# Patient Record
Sex: MALE | Race: WHITE | Employment: UNEMPLOYED | URBAN - METROPOLITAN AREA
[De-identification: names, ages, dates, MRNs, and addresses within clinical notes are randomized per-mention and may not be internally consistent; named-entity substitution may affect disease eponyms.]

---

## 2023-07-14 ENCOUNTER — HOSPITAL ENCOUNTER (INPATIENT)
Age: 33
LOS: 10 days | Discharge: HOME | DRG: 753 | End: 2023-07-24
Attending: EMERGENCY MEDICINE | Admitting: PSYCHIATRY & NEUROLOGY
Payer: COMMERCIAL

## 2023-07-14 DIAGNOSIS — F29 PSYCHOSIS, UNSPECIFIED PSYCHOSIS TYPE (HCC): Primary | ICD-10-CM

## 2023-07-14 PROBLEM — F31.2 SEVERE MANIC BIPOLAR 1 DISORDER WITH PSYCHOTIC BEHAVIOR (HCC): Status: ACTIVE | Noted: 2023-07-14

## 2023-07-14 LAB
ALBUMIN SERPL-MCNC: 3.9 G/DL (ref 3.5–5.2)
ALP SERPL-CCNC: 72 U/L (ref 40–129)
ALT SERPL-CCNC: 10 U/L (ref 0–40)
AMPHET UR QL SCN: POSITIVE
ANION GAP SERPL CALCULATED.3IONS-SCNC: 11 MMOL/L (ref 7–16)
APAP SERPL-MCNC: <5 MCG/ML (ref 10–30)
AST SERPL-CCNC: 10 U/L (ref 0–39)
BARBITURATES UR QL SCN: NOT DETECTED
BASOPHILS # BLD: 0.03 E9/L (ref 0–0.2)
BASOPHILS NFR BLD: 0.4 % (ref 0–2)
BENZODIAZ UR QL SCN: NOT DETECTED
BILIRUB SERPL-MCNC: 0.3 MG/DL (ref 0–1.2)
BUN SERPL-MCNC: 17 MG/DL (ref 6–20)
CALCIUM SERPL-MCNC: 9.2 MG/DL (ref 8.6–10.2)
CANNABINOIDS UR QL SCN: POSITIVE
CHLORIDE SERPL-SCNC: 104 MMOL/L (ref 98–107)
CK SERPL-CCNC: 70 U/L (ref 20–200)
CO2 SERPL-SCNC: 24 MMOL/L (ref 22–29)
COCAINE UR QL SCN: NOT DETECTED
CREAT SERPL-MCNC: 1.2 MG/DL (ref 0.7–1.2)
DRUG SCREEN COMMENT UR-IMP: ABNORMAL
EOSINOPHIL # BLD: 0.26 E9/L (ref 0.05–0.5)
EOSINOPHIL NFR BLD: 3.5 % (ref 0–6)
ERYTHROCYTE [DISTWIDTH] IN BLOOD BY AUTOMATED COUNT: 13.8 FL (ref 11.5–15)
ETHANOLAMINE SERPL-MCNC: <10 MG/DL (ref 0–0.08)
FENTANYL SCREEN, URINE: NOT DETECTED
GLUCOSE SERPL-MCNC: 81 MG/DL (ref 74–99)
HCT VFR BLD AUTO: 37.8 % (ref 37–54)
HGB BLD-MCNC: 12.3 G/DL (ref 12.5–16.5)
IMM GRANULOCYTES # BLD: 0.03 E9/L
IMM GRANULOCYTES NFR BLD: 0.4 % (ref 0–5)
LYMPHOCYTES # BLD: 2.27 E9/L (ref 1.5–4)
LYMPHOCYTES NFR BLD: 30.2 % (ref 20–42)
MCH RBC QN AUTO: 28.7 PG (ref 26–35)
MCHC RBC AUTO-ENTMCNC: 32.5 % (ref 32–34.5)
MCV RBC AUTO: 88.3 FL (ref 80–99.9)
METHADONE UR QL SCN: NOT DETECTED
MONOCYTES # BLD: 0.39 E9/L (ref 0.1–0.95)
MONOCYTES NFR BLD: 5.2 % (ref 2–12)
NEUTROPHILS # BLD: 4.53 E9/L (ref 1.8–7.3)
NEUTS SEG NFR BLD: 60.3 % (ref 43–80)
OPIATES UR QL SCN: NOT DETECTED
OXYCODONE URINE: NOT DETECTED
PCP UR QL SCN: NOT DETECTED
PLATELET # BLD AUTO: 330 E9/L (ref 130–450)
PMV BLD AUTO: 9 FL (ref 7–12)
POTASSIUM SERPL-SCNC: 3.5 MMOL/L (ref 3.5–5)
PROT SERPL-MCNC: 6.8 G/DL (ref 6.4–8.3)
RBC # BLD AUTO: 4.28 E12/L (ref 3.8–5.8)
SALICYLATES SERPL-MCNC: <0.3 MG/DL (ref 0–30)
SODIUM SERPL-SCNC: 139 MMOL/L (ref 132–146)
TRICYCLIC ANTIDEPRESSANTS SCREEN SERUM: NEGATIVE NG/ML
VALPROATE SERPL-MCNC: 5 MCG/ML (ref 50–100)
WBC # BLD: 7.5 E9/L (ref 4.5–11.5)

## 2023-07-14 PROCEDURE — 1240000000 HC EMOTIONAL WELLNESS R&B

## 2023-07-14 PROCEDURE — 2580000003 HC RX 258

## 2023-07-14 PROCEDURE — 99285 EMERGENCY DEPT VISIT HI MDM: CPT

## 2023-07-14 PROCEDURE — 82077 ASSAY SPEC XCP UR&BREATH IA: CPT

## 2023-07-14 PROCEDURE — 80307 DRUG TEST PRSMV CHEM ANLYZR: CPT

## 2023-07-14 PROCEDURE — 93005 ELECTROCARDIOGRAM TRACING: CPT | Performed by: EMERGENCY MEDICINE

## 2023-07-14 PROCEDURE — 96372 THER/PROPH/DIAG INJ SC/IM: CPT

## 2023-07-14 PROCEDURE — 80053 COMPREHEN METABOLIC PANEL: CPT

## 2023-07-14 PROCEDURE — 36415 COLL VENOUS BLD VENIPUNCTURE: CPT

## 2023-07-14 PROCEDURE — 80164 ASSAY DIPROPYLACETIC ACD TOT: CPT

## 2023-07-14 PROCEDURE — 80143 DRUG ASSAY ACETAMINOPHEN: CPT

## 2023-07-14 PROCEDURE — 80179 DRUG ASSAY SALICYLATE: CPT

## 2023-07-14 PROCEDURE — 6360000002 HC RX W HCPCS: Performed by: EMERGENCY MEDICINE

## 2023-07-14 PROCEDURE — 82550 ASSAY OF CK (CPK): CPT

## 2023-07-14 PROCEDURE — 85025 COMPLETE CBC W/AUTO DIFF WBC: CPT

## 2023-07-14 RX ORDER — WATER 1000 ML/1000ML
INJECTION, SOLUTION INTRAVENOUS
Status: COMPLETED
Start: 2023-07-14 | End: 2023-07-14

## 2023-07-14 RX ORDER — ZIPRASIDONE MESYLATE 20 MG/ML
20 INJECTION, POWDER, LYOPHILIZED, FOR SOLUTION INTRAMUSCULAR ONCE
Status: COMPLETED | OUTPATIENT
Start: 2023-07-14 | End: 2023-07-14

## 2023-07-14 RX ADMIN — ZIPRASIDONE MESYLATE 20 MG: 20 INJECTION, POWDER, LYOPHILIZED, FOR SOLUTION INTRAMUSCULAR at 19:45

## 2023-07-14 RX ADMIN — WATER 10 ML: 1 INJECTION INTRAMUSCULAR; INTRAVENOUS; SUBCUTANEOUS at 19:45

## 2023-07-14 ASSESSMENT — PAIN - FUNCTIONAL ASSESSMENT
PAIN_FUNCTIONAL_ASSESSMENT: NONE - DENIES PAIN
PAIN_FUNCTIONAL_ASSESSMENT: NONE - DENIES PAIN

## 2023-07-15 PROBLEM — F19.10 POLYSUBSTANCE ABUSE (HCC): Status: ACTIVE | Noted: 2023-07-15

## 2023-07-15 PROCEDURE — 6370000000 HC RX 637 (ALT 250 FOR IP): Performed by: PSYCHIATRY & NEUROLOGY

## 2023-07-15 PROCEDURE — 6370000000 HC RX 637 (ALT 250 FOR IP): Performed by: NURSE PRACTITIONER

## 2023-07-15 PROCEDURE — 1240000000 HC EMOTIONAL WELLNESS R&B

## 2023-07-15 PROCEDURE — 90792 PSYCH DIAG EVAL W/MED SRVCS: CPT | Performed by: NURSE PRACTITIONER

## 2023-07-15 PROCEDURE — 93005 ELECTROCARDIOGRAM TRACING: CPT | Performed by: NURSE PRACTITIONER

## 2023-07-15 RX ORDER — CHLORPROMAZINE HYDROCHLORIDE 100 MG/1
100 TABLET, FILM COATED ORAL 3 TIMES DAILY
Status: ON HOLD | COMMUNITY
End: 2023-07-18

## 2023-07-15 RX ORDER — DIVALPROEX SODIUM 500 MG/1
500 TABLET, DELAYED RELEASE ORAL EVERY 12 HOURS SCHEDULED
Status: DISCONTINUED | OUTPATIENT
Start: 2023-07-15 | End: 2023-07-24 | Stop reason: HOSPADM

## 2023-07-15 RX ORDER — HALOPERIDOL 5 MG/1
5 TABLET ORAL EVERY 6 HOURS PRN
Status: DISCONTINUED | OUTPATIENT
Start: 2023-07-15 | End: 2023-07-24 | Stop reason: HOSPADM

## 2023-07-15 RX ORDER — NICOTINE 21 MG/24HR
1 PATCH, TRANSDERMAL 24 HOURS TRANSDERMAL DAILY
Status: DISCONTINUED | OUTPATIENT
Start: 2023-07-15 | End: 2023-07-15

## 2023-07-15 RX ORDER — POLYETHYLENE GLYCOL 3350 17 G
2 POWDER IN PACKET (EA) ORAL
Status: DISCONTINUED | OUTPATIENT
Start: 2023-07-15 | End: 2023-07-24 | Stop reason: HOSPADM

## 2023-07-15 RX ORDER — ACETAMINOPHEN 325 MG/1
650 TABLET ORAL EVERY 6 HOURS PRN
Status: DISCONTINUED | OUTPATIENT
Start: 2023-07-15 | End: 2023-07-24 | Stop reason: HOSPADM

## 2023-07-15 RX ORDER — HYDROXYZINE PAMOATE 50 MG/1
50 CAPSULE ORAL 3 TIMES DAILY PRN
Status: DISCONTINUED | OUTPATIENT
Start: 2023-07-15 | End: 2023-07-24 | Stop reason: HOSPADM

## 2023-07-15 RX ORDER — NALOXONE HYDROCHLORIDE 4 MG/.1ML
1 SPRAY NASAL PRN
Status: ON HOLD | COMMUNITY
End: 2023-07-18

## 2023-07-15 RX ORDER — MAGNESIUM HYDROXIDE/ALUMINUM HYDROXICE/SIMETHICONE 120; 1200; 1200 MG/30ML; MG/30ML; MG/30ML
30 SUSPENSION ORAL PRN
Status: DISCONTINUED | OUTPATIENT
Start: 2023-07-15 | End: 2023-07-24 | Stop reason: HOSPADM

## 2023-07-15 RX ORDER — ARIPIPRAZOLE 5 MG/1
5 TABLET ORAL DAILY
Status: DISCONTINUED | OUTPATIENT
Start: 2023-07-15 | End: 2023-07-17

## 2023-07-15 RX ORDER — LANOLIN ALCOHOL/MO/W.PET/CERES
3 CREAM (GRAM) TOPICAL NIGHTLY PRN
Status: DISCONTINUED | OUTPATIENT
Start: 2023-07-15 | End: 2023-07-24 | Stop reason: HOSPADM

## 2023-07-15 RX ORDER — HALOPERIDOL 5 MG/ML
5 INJECTION INTRAMUSCULAR EVERY 6 HOURS PRN
Status: DISCONTINUED | OUTPATIENT
Start: 2023-07-15 | End: 2023-07-24 | Stop reason: HOSPADM

## 2023-07-15 RX ADMIN — DIVALPROEX SODIUM 500 MG: 500 TABLET, DELAYED RELEASE ORAL at 20:55

## 2023-07-15 RX ADMIN — NICOTINE POLACRILEX 2 MG: 2 LOZENGE ORAL at 13:59

## 2023-07-15 RX ADMIN — NICOTINE POLACRILEX 2 MG: 2 LOZENGE ORAL at 21:02

## 2023-07-15 RX ADMIN — ARIPIPRAZOLE 5 MG: 5 TABLET ORAL at 13:53

## 2023-07-15 RX ADMIN — MELATONIN 3 MG ORAL TABLET 3 MG: 3 TABLET ORAL at 20:56

## 2023-07-15 ASSESSMENT — SLEEP AND FATIGUE QUESTIONNAIRES
AVERAGE NUMBER OF SLEEP HOURS: 5
DO YOU USE A SLEEP AID: NO
SLEEP PATTERN: DIFFICULTY FALLING ASLEEP
SLEEP PATTERN: DIFFICULTY FALLING ASLEEP;DISTURBED/INTERRUPTED SLEEP
DO YOU USE A SLEEP AID: NO
DO YOU HAVE DIFFICULTY SLEEPING: YES
AVERAGE NUMBER OF SLEEP HOURS: 5
DO YOU HAVE DIFFICULTY SLEEPING: YES

## 2023-07-15 ASSESSMENT — LIFESTYLE VARIABLES
HOW MANY STANDARD DRINKS CONTAINING ALCOHOL DO YOU HAVE ON A TYPICAL DAY: 1 OR 2
HOW OFTEN DO YOU HAVE A DRINK CONTAINING ALCOHOL: NEVER
HOW MANY STANDARD DRINKS CONTAINING ALCOHOL DO YOU HAVE ON A TYPICAL DAY: PATIENT DOES NOT DRINK
HOW OFTEN DO YOU HAVE A DRINK CONTAINING ALCOHOL: MONTHLY OR LESS

## 2023-07-15 ASSESSMENT — PAIN SCALES - GENERAL
PAINLEVEL_OUTOF10: 0
PAINLEVEL_OUTOF10: 0

## 2023-07-15 NOTE — CARE COORDINATION
Biopsychosocial Assessment Note    Social work met with patient to complete the biopsychosocial assessment and C-SSRS. Chief Complaint: pt reports \" I admitted myself after I got my car impounded. \"     Mental Status Exam: pt alert&oriented x4. Pt cooperative. Pt mood anxious, labile, unstable affect. Pt eye contact good, speech pressured, tearful at times. Pt thoughts disorganized, tangential, flight of ideas, poor historian. Pt insight/judgement poor. Pt denied SI/HI/AVH. Clinical Summary: pt reports he called to get himself admitted after he had his car impounded. Pt reports a lady in a wheelchair Sammi Gandhi stopped him and told him to give her the keys to his car. Pt stated he deescalated the situation by giving her his keys. Pt spoke about a child having autism and a food stand. Pt was difficult to follow. Pt has a hx of inpatient psychiatric admissions and reports recently being admitted to [facility] three times within the last month. Pt reports two of the times he went in person and the third time he was a patient, unable to elaborate on this further. Pt reports being on a MCNAIR, unable to provide further details. Pt reports being on an AOT with [facility] in [place]. Pt denied any hx of suicide attempts, suicide attempts, or self-injurious behaviors. Pt denied alcohol use however then stated he will have one beer socially. Pt stated \"I love fighting when I'm drunk. \" Pt reports past drug use and stated he has used everything other than PCP. Pt reports having a medical marijuana card. Pt stated he last used meth when he was at home, unable to say when he was last at home. Pt UDS positive for amphetamines and cannabis. Per chart, pt has a hx of meth, cocaine, and fentanyl use. Pt reports a hx of sobriety, unable to provide further details. Pt reports legal hx of being charged at least 35 times for nonviolent offenses. Pt reports a hx of criminal trespassing. Pt reports being on non reported probation.  Pt

## 2023-07-15 NOTE — ED NOTES
Geodon Im given due to patient being very manic, flight of ideas, talking about aggressive behaviors, constant talking.      Rosie Deleon RN  07/14/23 1947
Nurse to nurse given to [facility], RN on [facility].      Dolores Dent RN  07/14/23 6010
Patient giving multiple stories upon arrival of why he is here and what brought him to this area including coming up for work, coming up for a music festive but unable to give name of town it is being held in. Then told the doctor that he was here to see a girl and go to a party he saw on line.   When verifying 2 patient identifiers patient gave a different birth date x2 than the one he gave on arrival.        Marcela Montejo RN  07/14/23 8213
Patient is sleeping.      Joey Rangel RN  07/14/23 8361
Patient's belongings labeled and placed into [facility] AN AFFILIATE OF [facility] ronda #27. 1 bruce Etienne  07/14/23 1501
Presented patient to [facility], NP, accepted to be admitted to [facility].      Cristine Woo RN  07/14/23 1207
The pt was accepted to [facility]. Disposition called to [facility] in admitting.      Extension Jesus George, [facility]  07/14/23 4611
where he \"turns over his car to in pound\". Once medically cleared the pt will be reviewed for admission to psych. Collateral Information: Mom [phone]    Risk Factors:   Out of area      Off meds      Lack of self care      Poor communication skills       Hx of inpt psych      Dad killed self by hanging in 9th grade      Substance abuse      Protective Factors:  Has support of mom      Has a provider      No hx of SI                Suicidal Ideations:  [] Reports:    [] Past [] Present   [x] Denies    Suicide Attempts:  [] Reports:   [x] Denies    C-SSRS Screening Completed by RN: Current Suicide Risk:  [x] No Risk [] Low [] Moderate [] High    Homicidal Ideations:  [] Reports:   [] Past [] Present   [x] Denies     Self Injurious/Self Mutilation Behaviors:  [] Reports:    [] Past [] Present   [x] Denies    Hallucinations/Delusions:  [] Reports:   [x] Denies     Substance Use/Alcohol Use/Addiction:  [x] Reports: Cannabis and meth and social alcohol- once a month- mom stated hx of meth, cocaine and fentanyl  [] Denies   [x] SBIRT Screen Complete. Current or Past Substance Abuse Treatment:  [x] Yes, When and Where: \"my whole life I used to be a fentanyl fiend\"   [] No    Current or Past Mental Health Treatment:  [x] Yes, When and Where: IOP at [facility] Started\"- admitted all of June [facility]- admitted \"at least once a year\"- stated that he does a monthly injection- nii- Mom reported was on depakote  [] No    Legal Issues:  [x]  Yes (Specify) arrested in past for trespassing or loitering- had a guardian until 6 mo ago  []  No    Access to Weapons:  [x]  Yes (Specify)  \"probably\"  []  No    Trauma History:  [x] Reports: \"everything\"  [] Denies     Living Situation: The pt lives in a trailer- per pt and mom    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

## 2023-07-15 NOTE — GROUP NOTE
Group Therapy Note    Date: 7/15/2023  Start Time: 6259  End Time:  4480  Number of Participants: 10    Type of Group: Psychoeducation    Name: Perfectionism    Patient's Goal: Identify what is a perfectionist and how perfectionism affects mental health. Identify traits of a perfectionist vs. striving for excellence. Notes: Patients completed perfectionist quiz to help identify personal traits of perfectionism. CTRS educated patients about perfectionism's affect on mental health and differences between perfectionism vs. striving for excellence. Encouraged patients to share their experiences.     Status After Intervention:  Unchanged    Participation Level: Monopolizing    Participation Quality: Inappropriate and Intrusive    Speech:  pressured    Thought Process/Content: Flight of ideas    Affective Functioning: Exaggerated    Mood: euphoric    Level of consciousness:  Preoccupied    Response to Learning: Resistant    Endings: None Reported    Modes of Intervention: Education    Discipline Responsible: Psychoeducational Specialist      Signature:  Silvia Anton

## 2023-07-15 NOTE — GROUP NOTE
Group Therapy Note    Date: 7/15/2023    Group Start Time: 1120  Group End Time: 1200  Group Topic: Cognitive Skills    [facility] AC[facility] BH 1    DAVID Gonzalez LSW        Group Therapy Note    Attendees: 10       Patient's Goal: to participate in group discussion on self-management. Notes:  pt was an active participant in group discussion. Status After Intervention:  Unchanged    Participation Level:  Active Listener and Interactive    Participation Quality: Appropriate, Attentive, and Sharing      Speech:  normal      Thought Process/Content: Logical      Affective Functioning: Congruent      Mood: anxious      Level of consciousness:  Alert and Oriented x4      Response to Learning: Able to verbalize current knowledge/experience      Endings: None Reported    Modes of Intervention: Education, Support, and Socialization      Discipline Responsible: /Counselor      Signature:  DAVID Lopez LSW

## 2023-07-15 NOTE — H&P
Department of Psychiatry  History and Physical - Adult     CHIEF COMPLAINT: \"I gave this lady son who has autism a card for a discount of prescription drugs. \"    Patient was seen after discussing with the treatment team and reviewing the chart    CIRCUMSTANCES OF ADMISSION: presented to the ED brought in by police after he was reportedly falling onto a woman and trying to take her dog from her. Patient reported that he believed that it was his dog. HISTORY OF PRESENT ILLNESS:      The patient is a 27 y.o. male with significant past history of bipolar 1 disorder and A-fib presented to the ED brought in by police after he was reportedly falling onto a woman and trying to take her dog from her. Patient reported that he believed that it was his dog. Patient reported that he has not slept in 4 days in the ED his urine drug screen is positive for cannabis and amphetamines his blood alcohol level is negative he is medically clear admitted to [address] [place] [address] psychiatric unit for further psychiatric assessment stabilization and treatment      Upon evaluation today patient is extremely disorganized and poor historian. He is unable to give accurate history. He states that he was brought into the hospital by an ambulance after the  called an ambulance. When asked why the  were called an ambulance he states because a lady was trying to take his key father to his car. He states that she was driving a handicapped [employer] with her autistic son and that she wanted his car and his keys and he gave her the key 5. He later then was talking about how a woman's son had a food stand this child had autism and he stopped there and gave him a card to have a discount on prescription drugs and this upset the woman. Patient states he is from [place] but states that his friend lives nearby in [facility].   [place] friends he states prior to going there he

## 2023-07-15 NOTE — CARE COORDINATION
SW attempted to meet with pt to complete biopsychosocial assessment. Pt observed sleeping in bed. Pt did wake up briefly when his name was called however pt quickly fell back asleep. SW will try again later.

## 2023-07-15 NOTE — BH NOTE
Message read at [phone]    [employer] message sent to [facility]- Dr. Jordan Ramachandran regarding new consult.

## 2023-07-15 NOTE — CARE COORDINATION
SW attempted to meet with pt to complete biopsychosocial assessment. Pt observed sleeping in bed and did not wake up when SW said his name. SW will try again later.

## 2023-07-16 PROBLEM — F12.10 MARIHUANA ABUSE: Status: ACTIVE | Noted: 2023-07-16

## 2023-07-16 PROBLEM — R00.0 SINUS TACHYCARDIA: Status: ACTIVE | Noted: 2023-07-16

## 2023-07-16 PROBLEM — F15.10 AMPHETAMINE ABUSE (HCC): Status: ACTIVE | Noted: 2023-07-16

## 2023-07-16 LAB
CHOLEST SERPL-MCNC: 137 MG/DL
HBA1C MFR BLD: 5.5 % (ref 4–5.6)
HDLC SERPL-MCNC: 42 MG/DL
LDLC SERPL CALC-MCNC: 68 MG/DL
TRIGL SERPL-MCNC: 134 MG/DL
TSH SERPL DL<=0.05 MIU/L-ACNC: 1.11 UIU/ML (ref 0.27–4.2)
VLDLC SERPL CALC-MCNC: 27 MG/DL

## 2023-07-16 PROCEDURE — 1240000000 HC EMOTIONAL WELLNESS R&B

## 2023-07-16 PROCEDURE — 84443 ASSAY THYROID STIM HORMONE: CPT

## 2023-07-16 PROCEDURE — 36415 COLL VENOUS BLD VENIPUNCTURE: CPT

## 2023-07-16 PROCEDURE — 83036 HEMOGLOBIN GLYCOSYLATED A1C: CPT

## 2023-07-16 PROCEDURE — 6370000000 HC RX 637 (ALT 250 FOR IP): Performed by: NURSE PRACTITIONER

## 2023-07-16 PROCEDURE — 93005 ELECTROCARDIOGRAM TRACING: CPT

## 2023-07-16 PROCEDURE — 6370000000 HC RX 637 (ALT 250 FOR IP): Performed by: PSYCHIATRY & NEUROLOGY

## 2023-07-16 PROCEDURE — 80061 LIPID PANEL: CPT

## 2023-07-16 PROCEDURE — 99232 SBSQ HOSP IP/OBS MODERATE 35: CPT | Performed by: NURSE PRACTITIONER

## 2023-07-16 RX ADMIN — ARIPIPRAZOLE 5 MG: 5 TABLET ORAL at 09:13

## 2023-07-16 RX ADMIN — NICOTINE POLACRILEX 2 MG: 2 LOZENGE ORAL at 17:40

## 2023-07-16 RX ADMIN — DIVALPROEX SODIUM 500 MG: 500 TABLET, DELAYED RELEASE ORAL at 09:13

## 2023-07-16 RX ADMIN — HYDROXYZINE PAMOATE 50 MG: 50 CAPSULE ORAL at 18:05

## 2023-07-16 RX ADMIN — DIVALPROEX SODIUM 500 MG: 500 TABLET, DELAYED RELEASE ORAL at 21:19

## 2023-07-16 RX ADMIN — NICOTINE POLACRILEX 2 MG: 2 LOZENGE ORAL at 10:00

## 2023-07-16 RX ADMIN — NICOTINE POLACRILEX 2 MG: 2 LOZENGE ORAL at 23:18

## 2023-07-16 RX ADMIN — MELATONIN 3 MG ORAL TABLET 3 MG: 3 TABLET ORAL at 21:19

## 2023-07-16 RX ADMIN — NICOTINE POLACRILEX 2 MG: 2 LOZENGE ORAL at 21:15

## 2023-07-16 ASSESSMENT — PAIN SCALES - GENERAL: PAINLEVEL_OUTOF10: 0

## 2023-07-16 NOTE — CONSULTS
Hospital Medicine  Consult History & Physical        Reason for consult:  medical management   Primary Care Physician:  No primary care provider on file. Date of Service: Pt seen/examined in consultation on 7/15/2023    History Of Present Illness:    Mr. Gaurav Awan, a 27y.o. year old male  who  has no past medical history on file. Medical management, pt is admitted to psych unit and we are consulted for atrial fibrillation control, not on home regimen. No chest pain fever or chills no nausea or vomiting or abdominal pain. Past Medical History:    History reviewed. No pertinent past medical history. Past Surgical History:    History reviewed. No pertinent surgical history. Medications Prior to Admission:    Prior to Admission medications    Medication Sig Start Date End Date Taking? Authorizing Provider   chlorproMAZINE (THORAZINE) 100 MG tablet Take 1 tablet by mouth 3 times daily   Yes Historical Provider, MD   naloxone 4 MG/0.1ML LIQD nasal spray 1 spray by Nasal route as needed for Opioid Reversal   Yes Historical Provider, MD     Medications Prior to Admission: chlorproMAZINE (THORAZINE) 100 MG tablet, Take 1 tablet by mouth 3 times daily  naloxone 4 MG/0.1ML LIQD nasal spray, 1 spray by Nasal route as needed for Opioid Reversal  [unfilled]    Allergies:  Patient has no known allergies.     Social History:   Social History     Socioeconomic History    Marital status: Unknown     Spouse name: Not on file    Number of children: Not on file    Years of education: Not on file    Highest education level: Not on file   Occupational History    Not on file   Tobacco Use    Smoking status: Every Day     Packs/day: 0.50     Types: Cigarettes    Smokeless tobacco: Current   Vaping Use    Vaping Use: Every day   Substance and Sexual Activity    Alcohol use: Not on file    Drug use: Not on file    Sexual activity: Not on file   Other Topics Concern    Not on file   Social History Narrative    Not on

## 2023-07-16 NOTE — BH NOTE
[facility]  Initial Interdisciplinary Treatment Plan NOTE    Review Date & Time: 47/16/23 0900    Patient was not in treatment team    Admission Type:   Admission Type: Involuntary (pink slip)    Reason for admission:  Reason for Admission: \"get some mental health help\"      Estimated Length of Stay Update:  3-5 days  Estimated Discharge Date Update: 3-5 days    EDUCATION:   Learner Progress Toward Treatment Goals: Reviewed results and recommendations of this team, Reviewed group plan and strategies, Reviewed signs, symptoms and risk of self harm and violent behavior, and Reviewed goals and plan of care    Method: Small group    Outcome: Verbalized understanding      PLAN/TREATMENT RECOMMENDATIONS UPDATE:Encourage patient to attend and participate in groups and take medications as prescribed.     GOALS UPDATE:   Time frame for Short-Term Goals: reassess daily    Newton Giraldo RN

## 2023-07-16 NOTE — GROUP NOTE
Group Therapy Note    Date: 7/16/2023  Start Time: 3605  End Time:  1050  Number of Participants: 9    Type of Group: Psychoeducation    Name: Strengths Exploration    Patient's Goal: Identify at least 1 personal strength and at least 1 strength of someone inspirational.    Notes: CTRS educated patients on how strengths affect mental health. Patients identified personal strengths and strengths of inspirational others. Encouraged patients to share experiences of when they found strength. Status After Intervention:  Improved    Participation Level:  Active Listener and Interactive    Participation Quality: Appropriate, Attentive, and Sharing    Speech:  normal    Thought Process/Content: Logical  Linear    Affective Functioning: Congruent    Mood:  Appropriate    Level of consciousness:  Alert and Attentive    Response to Learning: Able to verbalize current knowledge/experience, Able to verbalize/acknowledge new learning, Able to retain information, and Capable of insight    Endings: None Reported    Modes of Intervention: Education    Discipline Responsible: Psychoeducational Specialist      Signature:  Silvia Burleson

## 2023-07-17 LAB
EKG ATRIAL RATE: 91 BPM
EKG ATRIAL RATE: 91 BPM
EKG ATRIAL RATE: 95 BPM
EKG P AXIS: 67 DEGREES
EKG P AXIS: 70 DEGREES
EKG P AXIS: 76 DEGREES
EKG P-R INTERVAL: 140 MS
EKG P-R INTERVAL: 146 MS
EKG P-R INTERVAL: 154 MS
EKG Q-T INTERVAL: 308 MS
EKG Q-T INTERVAL: 314 MS
EKG Q-T INTERVAL: 330 MS
EKG QRS DURATION: 84 MS
EKG QRS DURATION: 88 MS
EKG QRS DURATION: 88 MS
EKG QTC CALCULATION (BAZETT): 378 MS
EKG QTC CALCULATION (BAZETT): 386 MS
EKG QTC CALCULATION (BAZETT): 414 MS
EKG R AXIS: 46 DEGREES
EKG R AXIS: 50 DEGREES
EKG R AXIS: 58 DEGREES
EKG T AXIS: 47 DEGREES
EKG T AXIS: 53 DEGREES
EKG T AXIS: 58 DEGREES
EKG VENTRICULAR RATE: 91 BPM
EKG VENTRICULAR RATE: 91 BPM
EKG VENTRICULAR RATE: 95 BPM

## 2023-07-17 PROCEDURE — 99232 SBSQ HOSP IP/OBS MODERATE 35: CPT | Performed by: NURSE PRACTITIONER

## 2023-07-17 PROCEDURE — 93010 ELECTROCARDIOGRAM REPORT: CPT | Performed by: INTERNAL MEDICINE

## 2023-07-17 PROCEDURE — 6370000000 HC RX 637 (ALT 250 FOR IP): Performed by: NURSE PRACTITIONER

## 2023-07-17 PROCEDURE — 1240000000 HC EMOTIONAL WELLNESS R&B

## 2023-07-17 PROCEDURE — 6370000000 HC RX 637 (ALT 250 FOR IP): Performed by: PSYCHIATRY & NEUROLOGY

## 2023-07-17 RX ORDER — ARIPIPRAZOLE 10 MG/1
10 TABLET ORAL DAILY
Status: DISCONTINUED | OUTPATIENT
Start: 2023-07-18 | End: 2023-07-19

## 2023-07-17 RX ADMIN — ARIPIPRAZOLE 5 MG: 5 TABLET ORAL at 08:29

## 2023-07-17 RX ADMIN — DIVALPROEX SODIUM 500 MG: 500 TABLET, DELAYED RELEASE ORAL at 19:36

## 2023-07-17 RX ADMIN — NICOTINE POLACRILEX 2 MG: 2 LOZENGE ORAL at 13:53

## 2023-07-17 RX ADMIN — NICOTINE POLACRILEX 2 MG: 2 LOZENGE ORAL at 20:20

## 2023-07-17 RX ADMIN — MELATONIN 3 MG ORAL TABLET 3 MG: 3 TABLET ORAL at 19:37

## 2023-07-17 RX ADMIN — DIVALPROEX SODIUM 500 MG: 500 TABLET, DELAYED RELEASE ORAL at 08:29

## 2023-07-17 RX ADMIN — ACETAMINOPHEN 650 MG: 325 TABLET ORAL at 08:19

## 2023-07-17 RX ADMIN — HYDROXYZINE PAMOATE 50 MG: 50 CAPSULE ORAL at 18:46

## 2023-07-17 RX ADMIN — NICOTINE POLACRILEX 2 MG: 2 LOZENGE ORAL at 10:54

## 2023-07-17 RX ADMIN — NICOTINE POLACRILEX 2 MG: 2 LOZENGE ORAL at 06:22

## 2023-07-17 RX ADMIN — NICOTINE POLACRILEX 2 MG: 2 LOZENGE ORAL at 16:13

## 2023-07-17 RX ADMIN — NICOTINE POLACRILEX 2 MG: 2 LOZENGE ORAL at 08:19

## 2023-07-17 ASSESSMENT — PAIN SCALES - GENERAL
PAINLEVEL_OUTOF10: 0
PAINLEVEL_OUTOF10: 3

## 2023-07-17 ASSESSMENT — PAIN DESCRIPTION - DESCRIPTORS: DESCRIPTORS: ACHING

## 2023-07-17 ASSESSMENT — PAIN DESCRIPTION - LOCATION: LOCATION: HEAD

## 2023-07-17 NOTE — CARE COORDINATION
Pt was seen during treatment team. Pt reports that he slept better last night. He reports that he does not need any medications, that he only needs his medical marijuana. Pt then reports that he uses meth to help him sleep. Pt reports that he collects furs from garbage [place] neighborhoods and that he has a zebra print fur at home. He reports that a woman walking a dog was wearing zebra print. Pt then reports that he thinks this dog is his because it listened to him when he gave it commands, such as telling it to go to the bathroom. Pt is elevated with flight of ideas, poor insight/judgement. SW approached pt after treatment team to discuss JOHANN. Pt agreed to sign JOHANN for his mother Dewayne Shah . Pt then states \"The reason I lost my keys is because a lady in a [employer] was driving erratically behind me and she came to me and said Downing Flatten me your fucking keys\" and I trusted her so I gave them to her. I'm too trusting sometimes. \"     LATOYA contacted pt mother Rashida Raymundo  (JOHANN signed) to gain collateral. She reports that her main concern is that in the last 4 weeks, pt has been hospitalized 4 times. She reports that he is diagnosed seasonal bipolar, always has an incident in the spring. Pt has hx of arastada injection. She reports that pt does not have his medications because someone was holding onto them for pt while pt was in a holding assisted. She reports that pt is on AOT through [facility] in [place], [place] on [facility]. She reports pt  is Ronen Brewer . She reports that her main concern for pt is that this cycle will not stop and he will continue to decompensate after discharge. Pt car is impounded and he has no keys. She needs to get keys made for pt car. She feels that pt needs a long term program for his mental health and substance abuse issues. LATOYA discussed pt choice with her. She reports pt father committed suicide when pt was in 9th grade.  She feels pt will die if he

## 2023-07-17 NOTE — GROUP NOTE
Group Therapy Note    Date: 7/17/2023    Group Start Time: 1105  Group End Time: 1150  Group Topic: Psychotherapy    [facility] ACUTE  [address], [facility] Debi        Group Therapy Note    Attendees: 10       Patient's Goal:  To increase socialization and improve interpersonal relationships. Notes:  Pt was an active participant in group discussion. Status After Intervention:  Improved    Participation Level: Active Listener and Interactive    Participation Quality: Appropriate, Attentive, Sharing, and Supportive      Speech:  pressured      Thought Process/Content: Linear      Affective Functioning: Blunted      Mood: elevated      Level of consciousness:  Alert, Oriented x4, and Attentive      Response to Learning: Resistant      Endings: None Reported    Modes of Intervention: Support, Socialization, and Exploration      Discipline Responsible: /Counselor      Signature:   DAVID Mulligan, [place]

## 2023-07-17 NOTE — GROUP NOTE
Group Therapy Note    Date: 7/17/2023    Group Start Time: 3238  Group End Time: 6886  Group Topic: Psychoeducation    [facility] AC[facility] BH 1    Eugenia Rasmussen [place]                                                                        Group Therapy Note    Date: 7/17/2023    Wellness Binder Information  Module Name:  dimensions of wellness   Patient's Goal:  patient will be able to id what parts of his/her life are going well and what can be improved. Notes:  pleasant and sharing in group, able to participate appropriately, willing to take handout. Status After Intervention:  Improved    Participation Level:  Active Listener and Interactive    Participation Quality: Appropriate, Attentive, and Sharing      Speech:  normal      Thought Process/Content: Logical      Affective Functioning: Congruent      Mood: euthymic      Level of consciousness:  Alert, Oriented x4, and Attentive      Response to Learning: Able to verbalize/acknowledge new learning, Able to retain information, and Progressing to goal      Endings: None Reported    Modes of Intervention: Education, Support, Socialization, and Problem-solving      Discipline Responsible: Psychoeducational Specialist      Signature:  Marco Antonio Acuna

## 2023-07-18 LAB
LV EF: 60 %
LVEF MODALITY: NORMAL

## 2023-07-18 PROCEDURE — 99232 SBSQ HOSP IP/OBS MODERATE 35: CPT | Performed by: NURSE PRACTITIONER

## 2023-07-18 PROCEDURE — 6370000000 HC RX 637 (ALT 250 FOR IP): Performed by: PSYCHIATRY & NEUROLOGY

## 2023-07-18 PROCEDURE — 6370000000 HC RX 637 (ALT 250 FOR IP): Performed by: NURSE PRACTITIONER

## 2023-07-18 PROCEDURE — 1240000000 HC EMOTIONAL WELLNESS R&B

## 2023-07-18 PROCEDURE — 93306 TTE W/DOPPLER COMPLETE: CPT

## 2023-07-18 RX ORDER — DIVALPROEX SODIUM 500 MG/1
500 TABLET, DELAYED RELEASE ORAL NIGHTLY
Status: ON HOLD | COMMUNITY
End: 2023-07-24 | Stop reason: HOSPADM

## 2023-07-18 RX ORDER — ARIPIPRAZOLE LAUROXIL 882 MG/3.2ML
882 INJECTION, SUSPENSION, EXTENDED RELEASE INTRAMUSCULAR
Status: ON HOLD | COMMUNITY
End: 2023-07-24 | Stop reason: HOSPADM

## 2023-07-18 RX ORDER — HYDROXYZINE PAMOATE 50 MG/1
50 CAPSULE ORAL DAILY PRN
COMMUNITY

## 2023-07-18 RX ORDER — QUETIAPINE FUMARATE 200 MG/1
200 TABLET, FILM COATED ORAL 2 TIMES DAILY
Status: ON HOLD | COMMUNITY
End: 2023-07-24 | Stop reason: HOSPADM

## 2023-07-18 RX ORDER — TRAZODONE HYDROCHLORIDE 50 MG/1
50 TABLET ORAL NIGHTLY PRN
Status: ON HOLD | COMMUNITY
End: 2023-07-24 | Stop reason: HOSPADM

## 2023-07-18 RX ADMIN — HYDROXYZINE PAMOATE 50 MG: 50 CAPSULE ORAL at 06:03

## 2023-07-18 RX ADMIN — HYDROXYZINE PAMOATE 50 MG: 50 CAPSULE ORAL at 00:06

## 2023-07-18 RX ADMIN — NICOTINE POLACRILEX 2 MG: 2 LOZENGE ORAL at 16:49

## 2023-07-18 RX ADMIN — DIVALPROEX SODIUM 500 MG: 500 TABLET, DELAYED RELEASE ORAL at 08:42

## 2023-07-18 RX ADMIN — NICOTINE POLACRILEX 2 MG: 2 LOZENGE ORAL at 06:05

## 2023-07-18 RX ADMIN — DIVALPROEX SODIUM 500 MG: 500 TABLET, DELAYED RELEASE ORAL at 20:38

## 2023-07-18 RX ADMIN — NICOTINE POLACRILEX 2 MG: 2 LOZENGE ORAL at 22:33

## 2023-07-18 RX ADMIN — HYDROXYZINE PAMOATE 50 MG: 50 CAPSULE ORAL at 11:10

## 2023-07-18 RX ADMIN — ARIPIPRAZOLE 10 MG: 10 TABLET ORAL at 08:42

## 2023-07-18 RX ADMIN — NICOTINE POLACRILEX 2 MG: 2 LOZENGE ORAL at 12:56

## 2023-07-18 RX ADMIN — HYDROXYZINE PAMOATE 50 MG: 50 CAPSULE ORAL at 20:38

## 2023-07-18 RX ADMIN — MELATONIN 3 MG ORAL TABLET 3 MG: 3 TABLET ORAL at 20:38

## 2023-07-18 RX ADMIN — NICOTINE POLACRILEX 2 MG: 2 LOZENGE ORAL at 09:55

## 2023-07-18 ASSESSMENT — PAIN - FUNCTIONAL ASSESSMENT: PAIN_FUNCTIONAL_ASSESSMENT: ACTIVITIES ARE NOT PREVENTED

## 2023-07-18 ASSESSMENT — PAIN SCALES - GENERAL
PAINLEVEL_OUTOF10: 0
PAINLEVEL_OUTOF10: 0

## 2023-07-18 NOTE — GROUP NOTE
Group Therapy Note    Date: 7/18/2023    Group Start Time: 1010  Group End Time: 5266  Group Topic: Psychoeducation    [facility] ACUTE BH [address], [place]                                                                        Group Therapy Note    Date: 7/18/2023  Module Name:  cognitive distortions     Patient's Goal:  patient will be able to id what a cognitive distortion is and how to combat them. Notes:  Pleasant and sharing in group, accepting of handout. Status After Intervention:  Improved    Participation Level:  Active Listener and Interactive    Participation Quality: Appropriate, Attentive, and Sharing      Speech:  normal      Thought Process/Content: Logical      Affective Functioning: Congruent      Mood: euthymic      Level of consciousness:  Alert, Oriented x4, and Attentive      Response to Learning: Able to verbalize/acknowledge new learning, Able to retain information, and Progressing to goal      Endings: None Reported    Modes of Intervention: Education, Support, Socialization, and Problem-solving      Discipline Responsible: Psychoeducational Specialist      Signature:  Rayna Crandall

## 2023-07-18 NOTE — CARE COORDINATION
LATOYA contacted [facility] [phone] to schedule appointment for pt. Pt has appointment 7/27 at 11:15am in office for medications. Pt appointment will be at [address] [place] [address]. LATOYA can fax discharge paperwork to [phone].

## 2023-07-18 NOTE — CARE COORDINATION
LATOYA received call from pt  [place]  asking about pt discharge date. LATOYA informed her discharge date is currently unknown, but that SW will schedule appointments prior to discharge. She states that pt medications should be sent directly to the pharmacy, that pt should not be sent with paper scripts. This pharmacy is [address], [address], [place]. Assigned RN informed.

## 2023-07-18 NOTE — CARE COORDINATION
Sw encouraged pt to attend psychotherapy group today.  Pt unable to attend group due to meeting with medical.

## 2023-07-18 NOTE — DISCHARGE INSTRUCTIONS
Follow up for Tobacco Cessation at:    [facility] Tobacco Treatment                                 Date:  Friday 7/28 at [facility].  [address]   ([address] [place] [address] elevators to 7th floor)   Phone: [phone]   Fax: [phone]

## 2023-07-19 PROCEDURE — 1240000000 HC EMOTIONAL WELLNESS R&B

## 2023-07-19 PROCEDURE — 6370000000 HC RX 637 (ALT 250 FOR IP): Performed by: NURSE PRACTITIONER

## 2023-07-19 PROCEDURE — 6370000000 HC RX 637 (ALT 250 FOR IP): Performed by: PSYCHIATRY & NEUROLOGY

## 2023-07-19 PROCEDURE — 99232 SBSQ HOSP IP/OBS MODERATE 35: CPT | Performed by: NURSE PRACTITIONER

## 2023-07-19 RX ORDER — ARIPIPRAZOLE 15 MG/1
15 TABLET ORAL DAILY
Status: DISCONTINUED | OUTPATIENT
Start: 2023-07-20 | End: 2023-07-24 | Stop reason: HOSPADM

## 2023-07-19 RX ADMIN — NICOTINE POLACRILEX 2 MG: 2 LOZENGE ORAL at 10:08

## 2023-07-19 RX ADMIN — NICOTINE POLACRILEX 2 MG: 2 LOZENGE ORAL at 13:38

## 2023-07-19 RX ADMIN — NICOTINE POLACRILEX 2 MG: 2 LOZENGE ORAL at 18:40

## 2023-07-19 RX ADMIN — DIVALPROEX SODIUM 500 MG: 500 TABLET, DELAYED RELEASE ORAL at 09:06

## 2023-07-19 RX ADMIN — DIVALPROEX SODIUM 500 MG: 500 TABLET, DELAYED RELEASE ORAL at 20:54

## 2023-07-19 RX ADMIN — MELATONIN 3 MG ORAL TABLET 3 MG: 3 TABLET ORAL at 21:20

## 2023-07-19 RX ADMIN — ARIPIPRAZOLE 10 MG: 10 TABLET ORAL at 09:05

## 2023-07-19 RX ADMIN — HYDROXYZINE PAMOATE 50 MG: 50 CAPSULE ORAL at 21:20

## 2023-07-19 RX ADMIN — HYDROXYZINE PAMOATE 50 MG: 50 CAPSULE ORAL at 06:48

## 2023-07-19 RX ADMIN — NICOTINE POLACRILEX 2 MG: 2 LOZENGE ORAL at 20:33

## 2023-07-19 ASSESSMENT — PAIN SCALES - GENERAL: PAINLEVEL_OUTOF10: 0

## 2023-07-19 NOTE — CARE COORDINATION
Pt approached LATOYA and asked for  Edwige's phone number. He states that he wants to discuss his discharge plan with her. SW provided number to pt. Pt also requested an update on the current medications he is prescribed. SW informed pt that he should discuss this with his nurse. Pt verbalized understanding. Pt is elevated, A&Ox4, cooperative and shares good eye contact.

## 2023-07-19 NOTE — CARE COORDINATION
Navigator placed phone call to [address] [address] [facility], left  with  requesting call back. Patient reportedly is on an AOT. Attempting to obtain document from the respective board/court allowing continued hospitalization for treatment/stabilization.     Electronically signed by DAVID Alcantara, LETICIA on 7/19/2023 at 10:09 AM

## 2023-07-19 NOTE — CARE COORDINATION
Received AOT commitment documentation from [facility]. Provided to 7S Unit. Waiting to receive signed court order.     Electronically signed by DAVID Gee, LETICIA on 7/19/2023 at 11:55 AM

## 2023-07-19 NOTE — GROUP NOTE
Group Therapy Note    Date: 7/19/2023  Start Time: 1500  End Time:  1600  Number of Participants: 8    Type of Group: Recreational    Wellness Binder Information  Module Name: Canvas Art    Patient's Goal:  To demonstrate one positive coping mechanism through the use art. Increase socialization amongst peers    Notes:  Patient was provided with a canvas and paint and encouraged to paint canvas to their liking. Markers were utilized to write positive affirmations or mantras on canvas. Patient actively engaged in activity, and demonstrated knowledge of the benefits of art as a coping mechanism. Status After Intervention:  Improved    Participation Level:  Active Listener and Interactive    Participation Quality: Appropriate, Attentive, Sharing, and Supportive      Speech:  normal      Thought Process/Content: Logical      Affective Functioning: Congruent      Mood: euthymic      Level of consciousness:  Alert and Attentive      Response to Learning: Able to verbalize current knowledge/experience, Able to verbalize/acknowledge new learning, and Able to retain information      Endings: None Reported    Modes of Intervention: Education, Socialization, Activity, and Media      Discipline Responsible: Psychoeducational Specialist      Signature:  Jacquelynn Oppenheim, CTRS     Group Therapy Note    Attendees: 8

## 2023-07-19 NOTE — GROUP NOTE
Group Therapy Note    Date: 7/19/2023    Group Start Time: 7219  Group End Time: 4061  Group Topic: Psychoeducation    [facility] 2    Shane De Oliveira                                                                        Group Therapy Note    Date: 7/19/2023  Start Time: 1015  End Time:  1055  Number of Participants: 9    Type of Group: Psychoeducation    Wellness Binder Information  Module Name:  Take time for yourself    Patient's Goal:  ID one or two positive ways to improve self-care and work into pt daily routine    Notes:  CTRS discussed the importance of self-care and provided helpful tips to improve self-care. Patient actively engaged in discussion and was accepting of self-care and self-reflection handout. Status After Intervention:  Improved    Participation Level:  Active Listener and Interactive    Participation Quality: Appropriate, Attentive, and Sharing      Speech:  normal      Thought Process/Content: Logical      Affective Functioning: Congruent      Mood: euthymic      Level of consciousness:  Alert and Attentive      Response to Learning: Able to verbalize current knowledge/experience and Able to verbalize/acknowledge new learning      Endings: None Reported    Modes of Intervention: Education, Socialization, Exploration, and Clarifying      Discipline Responsible: Psychoeducational Specialist      Signature:  FRIEDA De Oliveira     Group Therapy Note    Attendees: 9

## 2023-07-19 NOTE — CARE COORDINATION
Received court order from [facility]. Provided to 7S Unit. Patient admission will continue under probate status.     Electronically signed by DAVID Mabry, LETICIA on 7/19/2023 at 3:57 PM

## 2023-07-19 NOTE — CARE COORDINATION
Navigator spoke with [address] at [phone] . Brianna Altamirano reports that they had lost track of patient after he was recently arrested. Patient reportedly attended an event in the [address] [place] [address] area. Brianna Altamirano reports she will send this Navigator the [employer] Commitment paperwork. She reports she will get the court order from their court and will send it to Navigator once it is signed by the NISHA. Navigator waiting to receive documentation. Will provide to Unit Charge RN once received.     Updated NP, SW Supervisor, and assigned SW.    Electronically signed by Merian Aschoff, MSW, HARRISONW on 7/19/2023 at 10:25 AM

## 2023-07-19 NOTE — GROUP NOTE
Group Therapy Note    Date: 7/19/2023    Group Start Time: 1100  Group End Time: 1150  Group Topic: Psychotherapy    [facility] 1    DAVID Gonzalez LSW        Group Therapy Note    Attendees: 9       Patient's Goal:  To increase socialization and improve interpersonal relationships. Notes:  Pt was an active participant in group discussion. Status After Intervention:  Improved    Participation Level:  Active Listener and Interactive    Participation Quality: Appropriate, Attentive, Sharing, and Supportive      Speech:  normal      Thought Process/Content: Logical      Affective Functioning: Congruent      Mood: anxious      Level of consciousness:  Alert and Oriented x4      Response to Learning: Able to verbalize current knowledge/experience      Endings: None Reported    Modes of Intervention: Education, Support, and Socialization      Discipline Responsible: /Counselor      Signature:  DAVID Wagoner LSW

## 2023-07-20 LAB — VALPROATE SERPL-MCNC: 61 UG/ML (ref 50–100)

## 2023-07-20 PROCEDURE — 36415 COLL VENOUS BLD VENIPUNCTURE: CPT

## 2023-07-20 PROCEDURE — 1240000000 HC EMOTIONAL WELLNESS R&B

## 2023-07-20 PROCEDURE — 80164 ASSAY DIPROPYLACETIC ACD TOT: CPT

## 2023-07-20 PROCEDURE — 99232 SBSQ HOSP IP/OBS MODERATE 35: CPT | Performed by: NURSE PRACTITIONER

## 2023-07-20 PROCEDURE — 6370000000 HC RX 637 (ALT 250 FOR IP): Performed by: NURSE PRACTITIONER

## 2023-07-20 PROCEDURE — 6370000000 HC RX 637 (ALT 250 FOR IP): Performed by: PSYCHIATRY & NEUROLOGY

## 2023-07-20 RX ADMIN — DIVALPROEX SODIUM 500 MG: 500 TABLET, DELAYED RELEASE ORAL at 09:32

## 2023-07-20 RX ADMIN — DIVALPROEX SODIUM 500 MG: 500 TABLET, DELAYED RELEASE ORAL at 20:06

## 2023-07-20 RX ADMIN — NICOTINE POLACRILEX 2 MG: 2 LOZENGE ORAL at 06:14

## 2023-07-20 RX ADMIN — NICOTINE POLACRILEX 2 MG: 2 LOZENGE ORAL at 18:03

## 2023-07-20 RX ADMIN — ARIPIPRAZOLE 15 MG: 15 TABLET ORAL at 09:32

## 2023-07-20 RX ADMIN — HYDROXYZINE PAMOATE 50 MG: 50 CAPSULE ORAL at 20:06

## 2023-07-20 RX ADMIN — HYDROXYZINE PAMOATE 50 MG: 50 CAPSULE ORAL at 06:14

## 2023-07-20 ASSESSMENT — PAIN SCALES - GENERAL: PAINLEVEL_OUTOF10: 0

## 2023-07-20 NOTE — GROUP NOTE
Group Therapy Note    Date: 7/20/2023    Group Start Time: 1110  Group End Time: 0928  Group Topic: Psychotherapy    [facility]  [address], [facility]        Group Therapy Note    Attendees: 11       Patient's Goal:  To increase socialization and improve interpersonal relationships. Notes:  Pt was an active participant in group discussion. Status After Intervention:  Improved    Participation Level: Active Listener and Interactive    Participation Quality: Appropriate, Attentive, Sharing, and Supportive      Speech:  normal      Thought Process/Content: Linear      Affective Functioning: Exaggerated      Mood: elevated      Level of consciousness:  Alert, Oriented x4, and Attentive      Response to Learning: Able to verbalize current knowledge/experience, Able to verbalize/acknowledge new learning, Able to retain information, Capable of insight, and Progressing to goal      Endings: None Reported    Modes of Intervention: Support, Socialization, and Exploration      Discipline Responsible: /Counselor      Signature:   DAVID Angeles, [place]

## 2023-07-20 NOTE — GROUP NOTE
Group Therapy Note    Date: 7/20/2023    Group Start Time: 1010  Group End Time: 7958  Group Topic: Psychoeducation    [facility] 1    Hauserpaddy GaytanSilvia Rebecca                                                                        Group Therapy Note    Date: 7/20/2023    Type of Group: Psychoeducation    Wellness Binder Information  Module Name:  id of stressors     Patient's Goal:  Patient will be able to id physical symptoms to stress and daily/life events one is currently experiencing. Notes:  Pleasant and engaged in group, willing to share when prompted. Status After Intervention:  Improved    Participation Level:  Active Listener and Interactive    Participation Quality: Appropriate, Attentive, and Sharing      Speech:  normal      Thought Process/Content: Logical      Affective Functioning: Congruent      Mood: euthymic      Level of consciousness:  Alert, Oriented x4, and Attentive      Response to Learning: Able to verbalize/acknowledge new learning, Able to retain information, and Progressing to goal      Endings: None Reported    Modes of Intervention: Education, Support, Socialization, Exploration, and Problem-solving      Discipline Responsible: Psychoeducational Specialist      Signature:  Jaunita Landau

## 2023-07-21 PROCEDURE — 6370000000 HC RX 637 (ALT 250 FOR IP): Performed by: PSYCHIATRY & NEUROLOGY

## 2023-07-21 PROCEDURE — 6370000000 HC RX 637 (ALT 250 FOR IP): Performed by: NURSE PRACTITIONER

## 2023-07-21 PROCEDURE — 1240000000 HC EMOTIONAL WELLNESS R&B

## 2023-07-21 RX ADMIN — NICOTINE POLACRILEX 2 MG: 2 LOZENGE ORAL at 17:32

## 2023-07-21 RX ADMIN — ACETAMINOPHEN 650 MG: 325 TABLET ORAL at 00:16

## 2023-07-21 RX ADMIN — NICOTINE POLACRILEX 2 MG: 2 LOZENGE ORAL at 15:34

## 2023-07-21 RX ADMIN — DIVALPROEX SODIUM 500 MG: 500 TABLET, DELAYED RELEASE ORAL at 08:47

## 2023-07-21 RX ADMIN — DIVALPROEX SODIUM 500 MG: 500 TABLET, DELAYED RELEASE ORAL at 21:06

## 2023-07-21 RX ADMIN — ARIPIPRAZOLE 15 MG: 15 TABLET ORAL at 08:47

## 2023-07-21 RX ADMIN — NICOTINE POLACRILEX 2 MG: 2 LOZENGE ORAL at 10:42

## 2023-07-21 RX ADMIN — NICOTINE POLACRILEX 2 MG: 2 LOZENGE ORAL at 08:20

## 2023-07-21 ASSESSMENT — PAIN DESCRIPTION - DESCRIPTORS: DESCRIPTORS: ACHING;STABBING

## 2023-07-21 ASSESSMENT — PAIN - FUNCTIONAL ASSESSMENT: PAIN_FUNCTIONAL_ASSESSMENT: ACTIVITIES ARE NOT PREVENTED

## 2023-07-21 ASSESSMENT — PAIN SCALES - GENERAL
PAINLEVEL_OUTOF10: 0
PAINLEVEL_OUTOF10: 10

## 2023-07-21 ASSESSMENT — PAIN DESCRIPTION - LOCATION: LOCATION: BACK

## 2023-07-21 ASSESSMENT — PAIN DESCRIPTION - ORIENTATION: ORIENTATION: MID

## 2023-07-21 NOTE — GROUP NOTE
Group Therapy Note    Date: 7/21/2023    Group Start Time: 1100  Group End Time: 1140  Group Topic: Cognitive Skills    [facility] 1    DAVID Nguyen, hospitals        Group Therapy Note    Attendees: 15       Patient's Goal:  To discuss ways to improve relationships and communication. Notes:  Pt was an active participant in group discussion. Status After Intervention:  Improved    Participation Level: Active Listener and Interactive    Participation Quality: Appropriate, Attentive, Sharing, and Supportive      Speech:  normal      Thought Process/Content: Logical  Linear      Affective Functioning: Blunted      Mood: elevated      Level of consciousness:  Alert, Oriented x4, and Attentive      Response to Learning: Able to verbalize current knowledge/experience, Able to verbalize/acknowledge new learning, Able to retain information, Capable of insight, and Progressing to goal      Endings: None Reported    Modes of Intervention: Education, Support, Socialization, Exploration, Clarifying, and Problem-solving      Discipline Responsible: /Counselor      Signature:   DAVID Nguyen, [place]

## 2023-07-21 NOTE — GROUP NOTE
Group Therapy Note    Date: 7/21/2023    Group Start Time: 0900  Group End Time: 1000  Group Topic: Cognitive Skills    [facility] 1    DAVID Bueno LSW        Group Therapy Note    Attendees: 14       Patient's Goal:  Identification and exploration of relationships and boundaries    Notes:  pt was attentive in group and was able to identify ways to set boundaries     Status After Intervention:  Improved    Participation Level:  Active Listener and Interactive    Participation Quality: Appropriate, Attentive, and Sharing      Speech:  normal      Thought Process/Content: Logical      Affective Functioning: Congruent      Mood: euthymic      Level of consciousness:  Alert, Oriented x4, and Attentive      Response to Learning: Able to verbalize current knowledge/experience, Able to verbalize/acknowledge new learning, and Able to retain information      Endings: None Reported    Modes of Intervention: Education, Support, Socialization, Exploration, Clarifying, and Problem-solving      Discipline Responsible: /Counselor      Signature:  DAVID Bueno LSW

## 2023-07-22 PROCEDURE — 6370000000 HC RX 637 (ALT 250 FOR IP): Performed by: PSYCHIATRY & NEUROLOGY

## 2023-07-22 PROCEDURE — 6370000000 HC RX 637 (ALT 250 FOR IP): Performed by: NURSE PRACTITIONER

## 2023-07-22 PROCEDURE — 1240000000 HC EMOTIONAL WELLNESS R&B

## 2023-07-22 RX ADMIN — MELATONIN 3 MG ORAL TABLET 3 MG: 3 TABLET ORAL at 01:51

## 2023-07-22 RX ADMIN — NICOTINE POLACRILEX 2 MG: 2 LOZENGE ORAL at 14:56

## 2023-07-22 RX ADMIN — ACETAMINOPHEN 650 MG: 325 TABLET ORAL at 01:51

## 2023-07-22 RX ADMIN — HYDROXYZINE PAMOATE 50 MG: 50 CAPSULE ORAL at 01:51

## 2023-07-22 RX ADMIN — HYDROXYZINE PAMOATE 50 MG: 50 CAPSULE ORAL at 08:41

## 2023-07-22 RX ADMIN — NICOTINE POLACRILEX 2 MG: 2 LOZENGE ORAL at 08:41

## 2023-07-22 RX ADMIN — ACETAMINOPHEN 650 MG: 325 TABLET ORAL at 21:13

## 2023-07-22 RX ADMIN — ARIPIPRAZOLE 15 MG: 15 TABLET ORAL at 08:41

## 2023-07-22 RX ADMIN — DIVALPROEX SODIUM 500 MG: 500 TABLET, DELAYED RELEASE ORAL at 08:41

## 2023-07-22 RX ADMIN — DIVALPROEX SODIUM 500 MG: 500 TABLET, DELAYED RELEASE ORAL at 21:13

## 2023-07-22 RX ADMIN — NICOTINE POLACRILEX 2 MG: 2 LOZENGE ORAL at 11:50

## 2023-07-22 ASSESSMENT — PAIN DESCRIPTION - LOCATION
LOCATION: BACK
LOCATION: BACK

## 2023-07-22 ASSESSMENT — PAIN - FUNCTIONAL ASSESSMENT: PAIN_FUNCTIONAL_ASSESSMENT: ACTIVITIES ARE NOT PREVENTED

## 2023-07-22 ASSESSMENT — PAIN DESCRIPTION - DESCRIPTORS
DESCRIPTORS: STABBING;SHARP
DESCRIPTORS: ACHING

## 2023-07-22 ASSESSMENT — PAIN SCALES - GENERAL
PAINLEVEL_OUTOF10: 0
PAINLEVEL_OUTOF10: 5
PAINLEVEL_OUTOF10: 8
PAINLEVEL_OUTOF10: 0

## 2023-07-22 ASSESSMENT — PAIN DESCRIPTION - ORIENTATION: ORIENTATION: UPPER

## 2023-07-22 NOTE — GROUP NOTE
Group Therapy Note    Date: 7/22/2023    Group Start Time: 1100  Group End Time: 6956  Group Topic: Cognitive Skills    [facility]  Elm Yayo, MSW, LSW        Group Therapy Note    Attendees: 17       Patient's Goal:  pt will be able to identify values in themselves and things that they like about their selves. Pt will be able to discuss how other people see them. Notes:  Pt participated in group and made connections. Status After Intervention:  Improved    Participation Level:  Active Listener and Interactive    Participation Quality: Appropriate, Attentive, Sharing, and Supportive      Speech:  normal      Thought Process/Content: Logical  Linear      Affective Functioning: Congruent      Mood: anxious      Level of consciousness:  Alert, Oriented x4, and Attentive      Response to Learning: Able to verbalize current knowledge/experience, Able to verbalize/acknowledge new learning, Able to retain information, and Capable of insight      Endings: None Reported    Modes of Intervention: Education, Support, Socialization, Exploration, Clarifying, and Problem-solving      Discipline Responsible: /Counselor      Signature:  DAVID Rodriguez, [place]

## 2023-07-22 NOTE — GROUP NOTE
Group Therapy Note    Date: 7/22/2023    Group Start Time: 1500  Group End Time: 1600  Group Topic: Psychoeducation    [facility] 2    Shane Phelps                                                                        Group Therapy Note    Date: 7/22/2023  Start Time: 1500  End Time:  1600  Number of Participants: 16    Type of Group: Psychoeducation    Wellness Binder Information  Module Name:  Ask it Basket  Patient's Goal: To increase socialization amongst peers. Encourage decision making skills    Notes:  CTRS provided each patient with a piece of paper and asked patient to write down either a current stressor or a question. CTRS then gathered each paper and read them off. Patients were encouraged to provide suggestions to their peers. Patient was active in discussion. Status After Intervention:  Improved    Participation Level:  Active Listener and Interactive    Participation Quality: Appropriate, Attentive, and Sharing      Speech:  normal      Thought Process/Content: Logical      Affective Functioning: Congruent      Mood: euthymic      Level of consciousness:  Alert and Attentive      Response to Learning: Able to verbalize current knowledge/experience and Able to verbalize/acknowledge new learning      Endings: None Reported    Modes of Intervention: Education      Discipline Responsible: Psychoeducational Specialist      Signature:  FRIEDA Phelps     Group Therapy Note    Attendees: 16

## 2023-07-22 NOTE — BH NOTE
Patient is awake, alert, oriented x4. He is bright, slightly elevated but improved from early this week. He is pleasant, cooperative, social with peers. His thought process for me is logical and linear. He denies any SI/HI/AVH and does not appear to be responding to internal stimuli. He denies any anxiety or depression. He reports he has been going to groups. He reports he had his long-acting injection yesterday and he denies any ill effects from it. He reports he has had the shot \"for years\" and has no issues with it. Encouraged patient to go to groups. Encouraged appropriate milieu participation. Will continue to monitor.

## 2023-07-23 PROCEDURE — 6370000000 HC RX 637 (ALT 250 FOR IP): Performed by: NURSE PRACTITIONER

## 2023-07-23 PROCEDURE — 1240000000 HC EMOTIONAL WELLNESS R&B

## 2023-07-23 PROCEDURE — 6370000000 HC RX 637 (ALT 250 FOR IP): Performed by: PSYCHIATRY & NEUROLOGY

## 2023-07-23 RX ORDER — IBUPROFEN 400 MG/1
800 TABLET ORAL 3 TIMES DAILY PRN
Status: DISCONTINUED | OUTPATIENT
Start: 2023-07-23 | End: 2023-07-24 | Stop reason: HOSPADM

## 2023-07-23 RX ADMIN — MELATONIN 3 MG ORAL TABLET 3 MG: 3 TABLET ORAL at 00:50

## 2023-07-23 RX ADMIN — NICOTINE POLACRILEX 2 MG: 2 LOZENGE ORAL at 07:21

## 2023-07-23 RX ADMIN — DIVALPROEX SODIUM 500 MG: 500 TABLET, DELAYED RELEASE ORAL at 21:43

## 2023-07-23 RX ADMIN — NICOTINE POLACRILEX 2 MG: 2 LOZENGE ORAL at 19:02

## 2023-07-23 RX ADMIN — IBUPROFEN 800 MG: 400 TABLET, FILM COATED ORAL at 16:53

## 2023-07-23 RX ADMIN — ACETAMINOPHEN 650 MG: 325 TABLET ORAL at 10:56

## 2023-07-23 RX ADMIN — ARIPIPRAZOLE 15 MG: 15 TABLET ORAL at 08:45

## 2023-07-23 RX ADMIN — NICOTINE POLACRILEX 2 MG: 2 LOZENGE ORAL at 12:44

## 2023-07-23 RX ADMIN — HYDROXYZINE PAMOATE 50 MG: 50 CAPSULE ORAL at 19:02

## 2023-07-23 RX ADMIN — NICOTINE POLACRILEX 2 MG: 2 LOZENGE ORAL at 08:46

## 2023-07-23 RX ADMIN — DIVALPROEX SODIUM 500 MG: 500 TABLET, DELAYED RELEASE ORAL at 08:45

## 2023-07-23 ASSESSMENT — PAIN DESCRIPTION - LOCATION
LOCATION: BACK
LOCATION: BACK

## 2023-07-23 ASSESSMENT — PAIN SCALES - GENERAL
PAINLEVEL_OUTOF10: 8
PAINLEVEL_OUTOF10: 0
PAINLEVEL_OUTOF10: 8

## 2023-07-23 ASSESSMENT — PAIN DESCRIPTION - DESCRIPTORS
DESCRIPTORS: POUNDING;STABBING;SHARP
DESCRIPTORS: STABBING

## 2023-07-23 ASSESSMENT — PAIN DESCRIPTION - ORIENTATION
ORIENTATION: MID
ORIENTATION: LOWER

## 2023-07-23 NOTE — GROUP NOTE
Group Therapy Note    Date: 7/23/2023  Start Time: 1400  End Time:  4291  Number of Participants: 14    Type of Group: Psychoeducation    Wellness Binder Information  Module Name:  Chair Yoga and Meditation    Patient's Goal:  To engage in one new relaxation technique and ID one way to engage in meditation    Notes:  Patient actively engaged in [employer] led chair yoga and was provided a handout on poses. Patient actively engaged in relaxation mad libs. Status After Intervention:  Improved    Participation Level:  Active Listener and Interactive    Participation Quality: Appropriate, Attentive, and Sharing      Speech:  normal      Thought Process/Content: Logical      Affective Functioning: Congruent      Mood: euthymic      Level of consciousness:  Alert and Attentive      Response to Learning: Able to verbalize current knowledge/experience and Able to verbalize/acknowledge new learning      Endings: None Reported    Modes of Intervention: Education, Socialization, and Movement      Discipline Responsible: Psychoeducational Specialist      Signature:  FRIEDA Diop     Group Therapy Note    Attendees: 15

## 2023-07-24 VITALS
WEIGHT: 230 LBS | RESPIRATION RATE: 15 BRPM | OXYGEN SATURATION: 98 % | HEART RATE: 101 BPM | BODY MASS INDEX: 30.48 KG/M2 | TEMPERATURE: 98.1 F | HEIGHT: 73 IN | DIASTOLIC BLOOD PRESSURE: 55 MMHG | SYSTOLIC BLOOD PRESSURE: 96 MMHG

## 2023-07-24 PROCEDURE — 6370000000 HC RX 637 (ALT 250 FOR IP): Performed by: NURSE PRACTITIONER

## 2023-07-24 PROCEDURE — 6370000000 HC RX 637 (ALT 250 FOR IP): Performed by: PSYCHIATRY & NEUROLOGY

## 2023-07-24 RX ORDER — ARIPIPRAZOLE 15 MG/1
15 TABLET ORAL DAILY
Qty: 30 TABLET | Refills: 0 | Status: SHIPPED | OUTPATIENT
Start: 2023-07-25 | End: 2023-08-24

## 2023-07-24 RX ORDER — DIVALPROEX SODIUM 500 MG/1
500 TABLET, DELAYED RELEASE ORAL EVERY 12 HOURS SCHEDULED
Qty: 60 TABLET | Refills: 0 | Status: SHIPPED | OUTPATIENT
Start: 2023-07-24 | End: 2023-07-24 | Stop reason: SDUPTHER

## 2023-07-24 RX ORDER — POLYETHYLENE GLYCOL 3350 17 G
2 POWDER IN PACKET (EA) ORAL
Qty: 100 EACH | Refills: 3
Start: 2023-07-24 | End: 2023-08-23

## 2023-07-24 RX ORDER — ARIPIPRAZOLE 15 MG/1
15 TABLET ORAL DAILY
Qty: 30 TABLET | Refills: 0 | Status: SHIPPED | OUTPATIENT
Start: 2023-07-25 | End: 2023-07-24 | Stop reason: SDUPTHER

## 2023-07-24 RX ORDER — DIVALPROEX SODIUM 500 MG/1
500 TABLET, DELAYED RELEASE ORAL EVERY 12 HOURS SCHEDULED
Qty: 60 TABLET | Refills: 0 | Status: SHIPPED | OUTPATIENT
Start: 2023-07-24 | End: 2023-08-23

## 2023-07-24 RX ADMIN — DIVALPROEX SODIUM 500 MG: 500 TABLET, DELAYED RELEASE ORAL at 08:36

## 2023-07-24 RX ADMIN — ACETAMINOPHEN 650 MG: 325 TABLET ORAL at 08:36

## 2023-07-24 RX ADMIN — IBUPROFEN 800 MG: 400 TABLET, FILM COATED ORAL at 13:13

## 2023-07-24 RX ADMIN — NICOTINE POLACRILEX 2 MG: 2 LOZENGE ORAL at 06:08

## 2023-07-24 RX ADMIN — ARIPIPRAZOLE 15 MG: 15 TABLET ORAL at 08:36

## 2023-07-24 ASSESSMENT — PAIN DESCRIPTION - LOCATION
LOCATION: BACK
LOCATION: BACK

## 2023-07-24 ASSESSMENT — PAIN SCALES - GENERAL
PAINLEVEL_OUTOF10: 8
PAINLEVEL_OUTOF10: 6
PAINLEVEL_OUTOF10: 0

## 2023-07-24 ASSESSMENT — PAIN DESCRIPTION - DESCRIPTORS
DESCRIPTORS: ACHING;SHARP
DESCRIPTORS: ACHING

## 2023-07-24 ASSESSMENT — PAIN DESCRIPTION - ORIENTATION: ORIENTATION: LOWER

## 2023-07-24 NOTE — CARE COORDINATION
LATOYA contacted pt mother Ingrid [facility]  (JOHANN signed) to discuss pt. She states that pt is sounding much better to her. She states that he sounds very clear. She states that pt told her he feels like he got a lot of help while here. She is comfortable if pt were to discharge and has no concerns for pt. She states that pt is able to return home where he lives alone. 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 She would prefer that pt use his insurance for a ride home. She states that pt lives at [address] [place] [address], [facility]. She had questions regarding pt medications, SW transferred call to nurse.

## 2023-07-24 NOTE — DISCHARGE SUMMARY
workers obtain collateral information from patient's mother as well as the patient's  who was able to voicing concerns that they had. They reported no safety concerns no access to any guns. Patient is counseled he continues to be his drugs or alcohol he may act out impulsively causing serious harm to himself or others even though it may be unintentional.  He does understand of this as the capacity understand this treatment team felt the patient obtain the maximum benefit from his hospitalization he was set up with an outpatient mental health agency for outpatient follow-up services. At the time of discharge patient did not show any impulsive behavior. He was up on the unit he was attending groups and socializing with peers. He vehemently denied any suicidal homicidal ideations intent or plan. He was eating well and sleeping well there are no neurovegetative signs or symptoms of depression he denied any auditory or visual hallucinations. There are no overt or covert signs of psychosis. He was appreciative of the help that he received here. This patient no longer meets criteria for inpatient hospitalization. No AVH or paranoid thoughts  No Hopeless or worthless feeling  No active SI/HI  Appetite:  [x] Normal  [] Increased  [] Decreased    Sleep:       [x] Normal  [] Fair       [] Poor            Energy:    [x] Normal  [] Increased  [] Decreased     SI [] Present  [x] Absent  HI  []Present  [x] Absent   Aggression:  [] yes  [x] no  Patient is [x] able  [] unable to CONTRACT FOR SAFETY   Medication side effects(SE):  [x] None(Psych. Meds.) [] Other      Mental Status Examination on discharge:    Level of consciousness:  within normal limits   Appearance:  well-appearing  Behavior/Motor:  no abnormalities noted  Attitude toward examiner:  attentive and good eye contact  Speech:  spontaneous, normal rate and normal volume   Mood:  \"My mood is good\"  Affect: Appropriate and pleasant  Thought

## 2023-07-24 NOTE — CARE COORDINATION
SW received a call from pt mother Ana Paula Quiroz  (JOHANN signed). Ana Paula Quiroz is requesting for someone to call her when pt ride arrives so she can head to his home.

## 2023-07-24 NOTE — CARE COORDINATION
SW met with pt to discuss discharge. Pt found resting in bed, sits up and shares fair eye contact with SW. Pt states that he is feeling good mentally, denies SI/HI/AVH. Pt states that he plans to return to his home and states the address is [address] [place] [address], [id number]. Pt states that he would like to use his insurance ride to get there today. Pt states that he plans to follow up with [facility]. SW provided hospital excuse for pt. LATOYA contacted [employer] transportation [phone]. LATOYA spoke with Manny Mon to schedule transportation home from the hospital. LATOYA instructed that ride should arrive to main entrance on [facility] and call nurse's station upon arrival, number provided. Pt trip number is #[phone]. LATOYA contacted [facility] [phone] to inform them of pt long acting injection due on 8/20. No answer, LATOYA left detailed voicemail.      In order to ensure appropriate transition and discharge planning is in place, the following documents have been transmitted to [facility], as the new outpatient provider:    The d/c diagnosis was transmitted to the next care provider  The reason for hospitalization was transmitted to the next care provider  The d/c medications (dosage and indication) were transmitted to the next care provider   The continuing care plan was transmitted to the next care provider

## 2023-07-24 NOTE — PLAN OF CARE
[facility]  Day 3 Interdisciplinary Treatment Plan NOTE    Review Date & Time:  7/17/23 1000    Patient was in treatment team    Estimated Length of Stay Update:   5 DAYS  Estimated Discharge Date Update:  WED.     EDUCATION:   Learner Progress Toward Treatment Goals: Reviewed results and recommendations of this team    Method: Small group    Outcome: Verbalized understanding and Needs reinforcement    PATIENT GOALS: \"FOCUS ON ME\"    PLAN/TREATMENT RECOMMENDATIONS UPDATE: MEDICATIONS, GROUPS AS TOLERATED, ASSESS FRUSTRATION TOLERANCE, SUPPORTIVE CARE, DISPOSITION ASSIST, FOLLOW UP CARE    GOALS UPDATE:   Time frame for Short-Term Goals:  5 DAYS      Betty Carbajal RN
Care plan created and initiated with pt.
Problem: Pain  Goal: Verbalizes/displays adequate comfort level or baseline comfort level  7/17/2023 2115 by Delaney Jorgensen RN  Outcome: Progressing  7/17/2023 1416 by Arturo Bermudez RN  Outcome: Progressing     Problem: Risk for Elopement  Goal: Patient will not exit the unit/facility without proper excort  7/17/2023 2115 by Delaney Jorgensen RN  Outcome: Progressing  7/17/2023 1416 by Arturo Bermudez RN  Outcome: Progressing     Problem: Depression  Goal: Will be euthymic at discharge  Description: INTERVENTIONS:  1. Administer medication as ordered  2. Provide emotional support via 1:1 interaction with staff  3. Encourage involvement in milieu/groups/activities  4. Monitor for social isolation  7/17/2023 2115 by Delaney Jorgensen RN  Outcome: Progressing  7/17/2023 1416 by Arturo Bermudez RN  Outcome: Progressing     Pt denies SI, HI and AVH. Pt denies depression and anxiety. Proclaims vistaril to be \"The best drug around\" d/t he's not had any anxiety since he started taking it. Flight of ideas. Elevated. Rapid pressured speech. Medication compliant. Attends groups. Will continue to monitor.
Problem: Pain  Goal: Verbalizes/displays adequate comfort level or baseline comfort level  7/20/2023 1707 by Zaria Machuca RN  Outcome: Progressing  7/20/2023 0600 by Eddie Bell RN  Outcome: Progressing     Problem: Risk for Elopement  Goal: Patient will not exit the unit/facility without proper excort  7/20/2023 1707 by Zaria Machuca RN  Outcome: Progressing  7/20/2023 0600 by Eddie Bell RN  Outcome: Progressing
Problem: Pain  Goal: Verbalizes/displays adequate comfort level or baseline comfort level  7/21/2023 0929 by Hi Roberts RN  Outcome: Progressing  7/21/2023 0422 by Korin Horta RN  Outcome: Progressing     Problem: Risk for Elopement  Goal: Patient will not exit the unit/facility without proper excort  7/21/2023 0929 by Hi Roberts RN  Outcome: Progressing  7/21/2023 0422 by Korin Horta RN  Outcome: Progressing  7/20/2023 2111 by Mandeep Vasquez RN  Outcome: Progressing     Problem: Depression  Goal: Will be euthymic at discharge  Description: INTERVENTIONS:  1. Administer medication as ordered  2. Provide emotional support via 1:1 interaction with staff  3. Encourage involvement in milieu/groups/activities  4. Monitor for social isolation  7/21/2023 0929 by Hi Roberts RN  Outcome: Progressing  7/21/2023 0422 by Korin Horta RN  Outcome: Progressing  7/20/2023 2111 by Mandeep Vasquez RN  Outcome: Progressing     Problem: Yee  Goal: Will exhibit normal sleep and speech and no impulsivity  Description: INTERVENTIONS:  1. Administer medication as ordered  2. Set limits on impulsive behavior  3. Make attempts to decrease external stimuli as possible  7/21/2023 0929 by Hi Roberts RN  Outcome: Progressing  7/21/2023 0422 by Korin Horta RN  Outcome: Progressing  7/20/2023 2111 by Mandeep Vasquez RN  Outcome: Progressing     Problem: Psychosis  Goal: Will report no hallucinations or delusions  Description: INTERVENTIONS:  1. Administer medication as  ordered  2. Assist with reality testing to support increasing orientation  3.  Assess if patient's hallucinations or delusions are encouraging self harm or harm to others and intervene as appropriate  7/21/2023 0929 by Hi Roberts RN  Outcome: Progressing  7/21/2023 0422 by Korin Horta RN  Outcome: Progressing     Problem: Behavior  Goal: Pt/Family maintain appropriate behavior and adhere to behavioral management agreement, if
Problem: Pain  Goal: Verbalizes/displays adequate comfort level or baseline comfort level  7/24/2023 1243 by Jonathan Cadena RN  Outcome: Completed  7/24/2023 0835 by Jonathan Cadena RN  Outcome: Progressing  7/24/2023 0456 by Elda Goldman RN  Outcome: Progressing  7/24/2023 0456 by Elda Goldman RN  Outcome: Progressing  7/24/2023 0455 by Elda Goldman RN  Outcome: Progressing     Problem: Risk for Elopement  Goal: Patient will not exit the unit/facility without proper excort  7/24/2023 1243 by Jonathan Cadena, RN  Outcome: Completed  7/24/2023 0835 by Jonathan Cadena RN  Outcome: Progressing  7/24/2023 0456 by Elda Goldman RN  Outcome: Progressing  7/24/2023 0456 by Elda Goldman RN  Outcome: Progressing  7/24/2023 0455 by Elda Goldman RN  Outcome: Progressing     Problem: Depression  Goal: Will be euthymic at discharge  Description: INTERVENTIONS:  1. Administer medication as ordered  2. Provide emotional support via 1:1 interaction with staff  3. Encourage involvement in milieu/groups/activities  4. Monitor for social isolation  7/24/2023 1243 by Jonathan Cadena, RN  Outcome: Completed  7/24/2023 0835 by Jonathan Cadena RN  Outcome: Progressing  7/24/2023 0456 by Elda Goldman RN  Outcome: Progressing  7/24/2023 0456 by Elda Goldman RN  Outcome: Progressing  7/24/2023 0455 by Elda Goldman RN  Outcome: Progressing     Problem: Yee  Goal: Will exhibit normal sleep and speech and no impulsivity  Description: INTERVENTIONS:  1. Administer medication as ordered  2. Set limits on impulsive behavior  3.  Make attempts to decrease external stimuli as possible  7/24/2023 1243 by Jonathan Cadena RN  Outcome: Completed  7/24/2023 0835 by Jonathan Cadena RN  Outcome: Progressing  7/24/2023 0456 by Elda Goldman RN  Outcome: Progressing  7/24/2023 0456 by Elda Goldman RN  Outcome: Progressing  7/24/2023 0455 by Elda Goldman RN  Outcome: Progressing     Problem: Psychosis  Goal: Will report no
Problem: Pain  Goal: Verbalizes/displays adequate comfort level or baseline comfort level  Outcome: Progressing     Problem: Risk for Elopement  Goal: Patient will not exit the unit/facility without proper excort  Outcome: Progressing     Problem: Depression  Goal: Will be euthymic at discharge  Description: INTERVENTIONS:  1. Administer medication as ordered  2. Provide emotional support via 1:1 interaction with staff  3. Encourage involvement in milieu/groups/activities  4. Monitor for social isolation  Outcome: Progressing     Problem: Yee  Goal: Will exhibit normal sleep and speech and no impulsivity  Description: INTERVENTIONS:  1. Administer medication as ordered  2. Set limits on impulsive behavior  3. Make attempts to decrease external stimuli as possible  Outcome: Progressing     Problem: Psychosis  Goal: Will report no hallucinations or delusions  Description: INTERVENTIONS:  1. Administer medication as  ordered  2. Assist with reality testing to support increasing orientation  3. Assess if patient's hallucinations or delusions are encouraging self harm or harm to others and intervene as appropriate  Outcome: Progressing     Problem: Behavior  Goal: Pt/Family maintain appropriate behavior and adhere to behavioral management agreement, if implemented  Description: INTERVENTIONS:  1. Assess patient/family's coping skills and  non-compliant behavior (including use of illegal substances)  2. Notify security of behavior or suspected illegal substances which indicate the need for search of the family and/or belongings  3. Encourage verbalization of thoughts and concerns in a socially appropriate manner  4. Utilize positive, consistent limit setting strategies supporting safety of patient, staff and others  5. Encourage participation in the decision making process about the behavioral management agreement  6. If a visitor's behavior poses a threat to safety call refer to organization policy.   7. Initiate
Problem: Pain  Goal: Verbalizes/displays adequate comfort level or baseline comfort level  Outcome: Progressing     Problem: Risk for Elopement  Goal: Patient will not exit the unit/facility without proper excort  Outcome: Progressing     Problem: Depression  Goal: Will be euthymic at discharge  Description: INTERVENTIONS:  1. Administer medication as ordered  2. Provide emotional support via 1:1 interaction with staff  3. Encourage involvement in milieu/groups/activities  4. Monitor for social isolation  Outcome: Progressing     Pt denies SI, HI and AVH. Denies depression and anxiety. Medication compliant. Appetite appropriate. Brightened. Social. Calm and cooperative. Attends groups. Will continue to monitor.
Problem: Psychosis  Goal: Will report no hallucinations or delusions  Description: INTERVENTIONS:  1. Administer medication as  ordered  2. Assist with reality testing to support increasing orientation  3. Assess if patient's hallucinations or delusions are encouraging self harm or harm to others and intervene as appropriate  7/18/2023 1855 by Jonny Kowalski RN  Outcome: Progressing  7/18/2023 1157 by Deven Power RN  Outcome: Progressing               Out and socializing with peers. Taking po foods and fluids well. Denies suicidal or homicidal thoughts. Disheveled appearing but did trim mustache. No voiced delusions. Denies hallucinations.
Problem: Psychosis  Goal: Will report no hallucinations or delusions  Description: INTERVENTIONS:  1. Administer medication as  ordered  2. Assist with reality testing to support increasing orientation  3. Assess if patient's hallucinations or delusions are encouraging self harm or harm to others and intervene as appropriate  7/22/2023 1918 by Gail Salinas RN  Outcome: Progressing  7/22/2023 1040 by Anias Murillo RN  Outcome: Progressing  7/22/2023 0614 by Mini White RN  Outcome: Progressing                    Out and about the unit. Social and interacting with peers. Disheveled appearing. Denies suicidal thoughts or intent to harm self or others. Denies hallucinations. No voiced delusions.
Problem: Risk for Elopement  Goal: Patient will not exit the unit/facility without proper excort  7/20/2023 2111 by Becky Patel RN  Outcome: Progressing  7/20/2023 1707 by Daily Ramos RN  Outcome: Progressing     Problem: Depression  Goal: Will be euthymic at discharge  Description: INTERVENTIONS:  1. Administer medication as ordered  2. Provide emotional support via 1:1 interaction with staff  3. Encourage involvement in milieu/groups/activities  4. Monitor for social isolation  Outcome: Progressing     Problem: Yee  Goal: Will exhibit normal sleep and speech and no impulsivity  Description: INTERVENTIONS:  1. Administer medication as ordered  2. Set limits on impulsive behavior  3. Make attempts to decrease external stimuli as possible  Outcome: Progressing     Pt denies SI, HI and AVH. Pt denies depression and anxiety. Pt has pressured speech. Brightened. Social. Calm and cooperative. Medication compliant. Attends groups. Will continue to monitor.
Problem: Risk for Elopement  Goal: Patient will not exit the unit/facility without proper excort  Outcome: Progressing     Problem: Depression  Goal: Will be euthymic at discharge  Description: INTERVENTIONS:  1. Administer medication as ordered  2. Provide emotional support via 1:1 interaction with staff  3. Encourage involvement in milieu/groups/activities  4. Monitor for social isolation  Outcome: Progressing     Problem: Yee  Goal: Will exhibit normal sleep and speech and no impulsivity  Description: INTERVENTIONS:  1. Administer medication as ordered  2. Set limits on impulsive behavior  3. Make attempts to decrease external stimuli as possible  Outcome: Progressing     Problem: Psychosis  Goal: Will report no hallucinations or delusions  Description: INTERVENTIONS:  1. Administer medication as  ordered  2. Assist with reality testing to support increasing orientation  3. Assess if patient's hallucinations or delusions are encouraging self harm or harm to others and intervene as appropriate  Outcome: Progressing     Problem: Behavior  Goal: Pt/Family maintain appropriate behavior and adhere to behavioral management agreement, if implemented  Description: INTERVENTIONS:  1. Assess patient/family's coping skills and  non-compliant behavior (including use of illegal substances)  2. Notify security of behavior or suspected illegal substances which indicate the need for search of the family and/or belongings  3. Encourage verbalization of thoughts and concerns in a socially appropriate manner  4. Utilize positive, consistent limit setting strategies supporting safety of patient, staff and others  5. Encourage participation in the decision making process about the behavioral management agreement  6. If a visitor's behavior poses a threat to safety call refer to organization policy.   7. Initiate consult with , Psychosocial CNS, Spiritual Care as appropriate  Outcome: Progressing    AOT PAPERS FROM
Problem: Sleep Disturbance  Goal: Will exhibit normal sleeping pattern  Description: INTERVENTIONS:  1. Administer medication as ordered  2. Decrease environmental stimuli, including noise, as appropriate  3. Discourage social isolation and naps during the day  7/19/2023 1309 by Rosaura Cronin RN  Outcome: Not Progressing     Problem: Involuntary Admit  Goal: Will cooperate with staff recommendations and doctor's orders and will demonstrate appropriate behavior  Description: INTERVENTIONS:  1. Treat underlying conditions and offer medication as ordered  2. Educate regarding involuntary admission procedures and rules  3. Contain excessive/inappropriate behavior per unit and hospital policies  [id number]/[id number] by Rosaura Cronin RN  Outcome: Not Progressing     Problem: Self Care Deficit  Goal: Return ADL status to a safe level of function  Description: INTERVENTIONS:  1. Administer medication as ordered  2. Assess ADL deficits and provide assistive devices as needed  3. Obtain PT/OT consults as needed  4. Assist and instruct patient to increase activity and self care as tolerated  7/19/2023 1309 by Rosaura rConin RN  Outcome: Not Progressing     Problem: Safety - Adult  Goal: Free from fall injury  7/19/2023 1309 by Rosaura Cronin RN  Outcome: Not Progressing     Pt denies SI, HI and AVH. Pt denies depression and anxiety. Pressured speech. Increased energy. Medication compliant. Attends groups. Pt was asking for his seroquel which he took \"earlier. \" Educated pt he it was not ordered that medication nor was it given to him earlier. Brightened. Will continue to monitor.
Problem: Sleep Disturbance  Goal: Will exhibit normal sleeping pattern  Description: INTERVENTIONS:  1. Administer medication as ordered  2. Decrease environmental stimuli, including noise, as appropriate  3. Discourage social isolation and naps during the day  Outcome: Not Progressing     Problem: Involuntary Admit  Goal: Will cooperate with staff recommendations and doctor's orders and will demonstrate appropriate behavior  Description: INTERVENTIONS:  1. Treat underlying conditions and offer medication as ordered  2. Educate regarding involuntary admission procedures and rules  3. Contain excessive/inappropriate behavior per unit and hospital policies  Outcome: Not Progressing     Problem: Self Care Deficit  Goal: Return ADL status to a safe level of function  Description: INTERVENTIONS:  1. Administer medication as ordered  2. Assess ADL deficits and provide assistive devices as needed  3. Obtain PT/OT consults as needed  4. Assist and instruct patient to increase activity and self care as tolerated  Outcome: Not Progressing     Problem: Safety - Adult  Goal: Free from fall injury  Outcome: Not Progressing     Problem: Sleep Disturbance  Goal: Will exhibit normal sleeping pattern  Description: INTERVENTIONS:  1. Administer medication as ordered  2. Decrease environmental stimuli, including noise, as appropriate  3. Discourage social isolation and naps during the day  Outcome: Not Progressing     Problem: Involuntary Admit  Goal: Will cooperate with staff recommendations and doctor's orders and will demonstrate appropriate behavior  Description: INTERVENTIONS:  1. Treat underlying conditions and offer medication as ordered  2. Educate regarding involuntary admission procedures and rules  3. Contain excessive/inappropriate behavior per unit and hospital policies  Outcome: Not Progressing     PT. IS NON VERBAL AND AVOIDS EYE CONTACT. UP FOR MEALS AND THEN RETURNS TO BED. PT. DOES NOT FOLLOW VERBAL DIRECTION.  PT.
Pt denies suicidal ideation, homicidal ideation, and AV hallucinations. Pt is social with peers. He has rapid, pressured speech, with a disorganized thought process. He appears preoccupied and internally stimulated. Pt is attending groups and in control of his behavior at this time. Problem: Pain  Goal: Verbalizes/displays adequate comfort level or baseline comfort level  7/15/2023 1001 by Carmencita Segura RN  Outcome: Progressing     Problem: Risk for Elopement  Goal: Patient will not exit the unit/facility without proper excort  7/15/2023 1001 by Carmencita Segura RN  Outcome: Progressing     Problem: Depression  Goal: Will be euthymic at discharge  Description: INTERVENTIONS:  1. Administer medication as ordered  2. Provide emotional support via 1:1 interaction with staff  3. Encourage involvement in milieu/groups/activities  4. Monitor for social isolation  7/15/2023 1001 by Carmencita Segura RN  Outcome: Progressing     Problem: Yee  Goal: Will exhibit normal sleep and speech and no impulsivity  Description: INTERVENTIONS:  1. Administer medication as ordered  2. Set limits on impulsive behavior  3. Make attempts to decrease external stimuli as possible  7/15/2023 1001 by Carmencita Segura RN  Outcome: Progressing     Problem: Psychosis  Goal: Will report no hallucinations or delusions  Description: INTERVENTIONS:  1. Administer medication as  ordered  2. Assist with reality testing to support increasing orientation  3. Assess if patient's hallucinations or delusions are encouraging self harm or harm to others and intervene as appropriate  7/15/2023 1001 by Carmencita Segura RN  Outcome: Progressing     Problem: Behavior  Goal: Pt/Family maintain appropriate behavior and adhere to behavioral management agreement, if implemented  Description: INTERVENTIONS:  1. Assess patient/family's coping skills and  non-compliant behavior (including use of illegal substances)  2.  Notify security of behavior or suspected
Pt denies suicidal ideation, homicidal ideation, and AV hallucinations. Pt states he only slept until midnight and he has been up since. He has rapid, pressured, speech, with flight of ideas. Patient reported \"I'm manic AF\". Pt is restless walking around the unit, he is social with peers. He is taking meds and going to group. Problem: Pain  Goal: Verbalizes/displays adequate comfort level or baseline comfort level  Outcome: Progressing     Problem: Risk for Elopement  Goal: Patient will not exit the unit/facility without proper excort  Outcome: Progressing     Problem: Depression  Goal: Will be euthymic at discharge  Description: INTERVENTIONS:  1. Administer medication as ordered  2. Provide emotional support via 1:1 interaction with staff  3. Encourage involvement in milieu/groups/activities  4. Monitor for social isolation  Outcome: Progressing     Problem: Yee  Goal: Will exhibit normal sleep and speech and no impulsivity  Description: INTERVENTIONS:  1. Administer medication as ordered  2. Set limits on impulsive behavior  3. Make attempts to decrease external stimuli as possible  Outcome: Progressing     Problem: Psychosis  Goal: Will report no hallucinations or delusions  Description: INTERVENTIONS:  1. Administer medication as  ordered  2. Assist with reality testing to support increasing orientation  3. Assess if patient's hallucinations or delusions are encouraging self harm or harm to others and intervene as appropriate  Outcome: Progressing     Problem: Behavior  Goal: Pt/Family maintain appropriate behavior and adhere to behavioral management agreement, if implemented  Description: INTERVENTIONS:  1. Assess patient/family's coping skills and  non-compliant behavior (including use of illegal substances)  2. Notify security of behavior or suspected illegal substances which indicate the need for search of the family and/or belongings  3.  Encourage verbalization of thoughts and concerns in a socially
Pt resting in bed apparently asleep with easy even respirations at HS q 15 min electronic rounding.
Progressing    Patient denies SI/HI and hallucinations. He is brightened on approach. He appears elevated but improved. He is out on the unit, social with peers, attending provided meals and groups. He is taking prescribed medications without issue. Will continue to offer support and comfort to patient.
harm to others and intervene as appropriate  7/24/2023 0835 by Leslie Marcos RN  Outcome: Progressing  7/24/2023 0456 by Kyle Velarde RN  Outcome: Progressing  7/24/2023 0456 by Kyle Velarde RN  Outcome: Progressing  7/24/2023 0455 by Kyle Velarde RN  Outcome: Progressing     Problem: Behavior  Goal: Pt/Family maintain appropriate behavior and adhere to behavioral management agreement, if implemented  Description: INTERVENTIONS:  1. Assess patient/family's coping skills and  non-compliant behavior (including use of illegal substances)  2. Notify security of behavior or suspected illegal substances which indicate the need for search of the family and/or belongings  3. Encourage verbalization of thoughts and concerns in a socially appropriate manner  4. Utilize positive, consistent limit setting strategies supporting safety of patient, staff and others  5. Encourage participation in the decision making process about the behavioral management agreement  6. If a visitor's behavior poses a threat to safety call refer to organization policy. 7. Initiate consult with , Psychosocial CNS, Spiritual Care as appropriate  7/24/2023 4031 by Leslie Marcos RN  Outcome: Progressing  7/24/2023 0456 by Kyle Velarde RN  Outcome: Progressing  7/24/2023 0456 by Kyle Velarde RN  Outcome: Progressing  7/24/2023 0455 by Kyle Velarde RN  Outcome: Progressing     Problem: Drug Abuse/Detox  Goal: Will have no detox symptoms and will verbalize plan for changing drug-related behavior  Description: INTERVENTIONS:  1. Administer medication as ordered  2. Monitor physical status  3. Provide emotional support with 1:1 interaction with staff  4.  Encourage  recovery focused treatment   7/24/2023 0835 by Leslie Marcos RN  Outcome: Progressing  7/24/2023 0456 by Kyle Velarde RN  Outcome: Progressing  7/24/2023 0456 by Kyle Velarde RN  Outcome: Progressing  7/24/2023 0455 by Kyle Velarde RN  Outcome: Progressing
EVENTS PRIOR TO ADMISSION. PT. IS MEDICATION COMPLIANT AND ATTENDS SELECT GROUPS.

## 2023-07-24 NOTE — GROUP NOTE
Group Therapy Note    Date: 7/24/2023    Group Start Time: 1110  Group End Time: 1210  Group Topic: Psychotherapy    [facility]  [address], [facility] [place]        Group Therapy Note    Attendees: 7       Patient's Goal:  To increase socialization and improve interpersonal relationships. Notes:  Pt was an active participant in group discussion. Status After Intervention:  Improved    Participation Level: Active Listener and Interactive    Participation Quality: Appropriate, Attentive, Sharing, and Supportive      Speech:  normal      Thought Process/Content: Logical  Linear      Affective Functioning: Congruent      Mood: euthymic      Level of consciousness:  Alert, Oriented x4, and Attentive      Response to Learning: Able to verbalize current knowledge/experience, Able to verbalize/acknowledge new learning, Able to retain information, Capable of insight, and Progressing to goal      Endings: None Reported    Modes of Intervention: Support, Socialization, and Exploration      Discipline Responsible: /Counselor      Signature:   DAVID Tinsley, [place]

## 2023-07-24 NOTE — GROUP NOTE
Group Therapy Note    Date: 7/24/2023    Group Start Time: 1010  Group End Time: 6635  Group Topic: Psychoeducation    [facility]    Shane Parson                                                                        Group Therapy Note    Date: 7/24/2023  Start Time: 1010  End Time:  1050  Number of Participants: 17    Type of Group: Psychoeducation    Wellness Binder Information  Module Name:  Self-love    Patient's Goal:  Id ways to improve self-love and self-esteem. Increase exploration of positive view of self    Notes:  CTRS discussed self-love and ways to improve self-love/self-esteem. Patient was present in discussion of topic and able to ID ways to improve self-esteem, and was accepting of handout. Status After Intervention:  Improved    Participation Level:  Active Listener and Interactive    Participation Quality: Appropriate, Attentive, and Sharing      Speech:  normal      Thought Process/Content: Logical      Affective Functioning: Congruent      Mood: euthymic      Level of consciousness:  Alert and Attentive      Response to Learning: Able to verbalize current knowledge/experience and Able to verbalize/acknowledge new learning      Endings: None Reported    Modes of Intervention: Education, Support, and Exploration      Discipline Responsible: Psychoeducational Specialist      Signature:  Shane Parson